# Patient Record
Sex: FEMALE | Race: WHITE | NOT HISPANIC OR LATINO | Employment: OTHER | URBAN - METROPOLITAN AREA
[De-identification: names, ages, dates, MRNs, and addresses within clinical notes are randomized per-mention and may not be internally consistent; named-entity substitution may affect disease eponyms.]

---

## 2017-03-17 ENCOUNTER — LAB (OUTPATIENT)
Dept: LAB | Facility: HOSPITAL | Age: 57
End: 2017-03-17

## 2017-03-17 ENCOUNTER — OFFICE VISIT (OUTPATIENT)
Dept: BARIATRICS/WEIGHT MGMT | Facility: CLINIC | Age: 57
End: 2017-03-17

## 2017-03-17 VITALS
DIASTOLIC BLOOD PRESSURE: 45 MMHG | TEMPERATURE: 97.8 F | HEART RATE: 70 BPM | SYSTOLIC BLOOD PRESSURE: 100 MMHG | RESPIRATION RATE: 16 BRPM | WEIGHT: 208 LBS | HEIGHT: 68 IN | BODY MASS INDEX: 31.52 KG/M2

## 2017-03-17 DIAGNOSIS — K21.9 GASTROESOPHAGEAL REFLUX DISEASE WITHOUT ESOPHAGITIS: ICD-10-CM

## 2017-03-17 DIAGNOSIS — M25.50 MULTIPLE JOINT PAIN: ICD-10-CM

## 2017-03-17 DIAGNOSIS — K59.00 CONSTIPATION, UNSPECIFIED CONSTIPATION TYPE: ICD-10-CM

## 2017-03-17 DIAGNOSIS — E66.9 OBESITY (BMI 30-39.9): ICD-10-CM

## 2017-03-17 DIAGNOSIS — E78.5 BORDERLINE HYPERLIPIDEMIA: ICD-10-CM

## 2017-03-17 DIAGNOSIS — E03.9 HYPOTHYROIDISM, UNSPECIFIED TYPE: ICD-10-CM

## 2017-03-17 DIAGNOSIS — Z71.3 DIETARY COUNSELING: ICD-10-CM

## 2017-03-17 DIAGNOSIS — I10 ESSENTIAL HYPERTENSION: ICD-10-CM

## 2017-03-17 DIAGNOSIS — L65.9 ALOPECIA: ICD-10-CM

## 2017-03-17 DIAGNOSIS — R42 DIZZINESS: ICD-10-CM

## 2017-03-17 DIAGNOSIS — E66.9 OBESITY (BMI 30-39.9): Primary | ICD-10-CM

## 2017-03-17 LAB
ALBUMIN SERPL-MCNC: 4.4 G/DL (ref 3.5–5.2)
ALBUMIN/GLOB SERPL: 1.8 G/DL
ALP SERPL-CCNC: 104 U/L (ref 39–117)
ALT SERPL W P-5'-P-CCNC: 27 U/L (ref 1–33)
ANION GAP SERPL CALCULATED.3IONS-SCNC: 15 MMOL/L
AST SERPL-CCNC: 20 U/L (ref 1–32)
BASOPHILS # BLD AUTO: 0.05 10*3/MM3 (ref 0–0.2)
BASOPHILS NFR BLD AUTO: 0.5 % (ref 0–1.5)
BILIRUB SERPL-MCNC: 0.6 MG/DL (ref 0.1–1.2)
BUN BLD-MCNC: 23 MG/DL (ref 6–20)
BUN/CREAT SERPL: 27.1 (ref 7–25)
CALCIUM SPEC-SCNC: 10.1 MG/DL (ref 8.6–10.5)
CHLORIDE SERPL-SCNC: 99 MMOL/L (ref 98–107)
CHOLEST SERPL-MCNC: 238 MG/DL (ref 0–200)
CO2 SERPL-SCNC: 27 MMOL/L (ref 22–29)
CREAT BLD-MCNC: 0.85 MG/DL (ref 0.57–1)
DEPRECATED RDW RBC AUTO: 55 FL (ref 37–54)
EOSINOPHIL # BLD AUTO: 0.21 10*3/MM3 (ref 0–0.7)
EOSINOPHIL NFR BLD AUTO: 2.2 % (ref 0.3–6.2)
ERYTHROCYTE [DISTWIDTH] IN BLOOD BY AUTOMATED COUNT: 17.3 % (ref 11.7–13)
FERRITIN SERPL-MCNC: 19.29 NG/ML (ref 13–150)
FOLATE SERPL-MCNC: >20 NG/ML (ref 4.78–24.2)
GFR SERPL CREATININE-BSD FRML MDRD: 69 ML/MIN/1.73
GLOBULIN UR ELPH-MCNC: 2.4 GM/DL
GLUCOSE BLD-MCNC: 95 MG/DL (ref 65–99)
HCT VFR BLD AUTO: 41.6 % (ref 35.6–45.5)
HDLC SERPL-MCNC: 59 MG/DL (ref 40–60)
HGB BLD-MCNC: 13.1 G/DL (ref 11.9–15.5)
IMM GRANULOCYTES # BLD: 0.02 10*3/MM3 (ref 0–0.03)
IMM GRANULOCYTES NFR BLD: 0.2 % (ref 0–0.5)
IRON 24H UR-MRATE: 93 MCG/DL (ref 37–145)
LDLC SERPL CALC-MCNC: 143 MG/DL (ref 0–100)
LDLC/HDLC SERPL: 2.42 {RATIO}
LYMPHOCYTES # BLD AUTO: 2.74 10*3/MM3 (ref 0.9–4.8)
LYMPHOCYTES NFR BLD AUTO: 28.4 % (ref 19.6–45.3)
MCH RBC QN AUTO: 27.5 PG (ref 26.9–32)
MCHC RBC AUTO-ENTMCNC: 31.5 G/DL (ref 32.4–36.3)
MCV RBC AUTO: 87.2 FL (ref 80.5–98.2)
MONOCYTES # BLD AUTO: 0.53 10*3/MM3 (ref 0.2–1.2)
MONOCYTES NFR BLD AUTO: 5.5 % (ref 5–12)
NEUTROPHILS # BLD AUTO: 6.1 10*3/MM3 (ref 1.9–8.1)
NEUTROPHILS NFR BLD AUTO: 63.2 % (ref 42.7–76)
PLATELET # BLD AUTO: 326 10*3/MM3 (ref 140–500)
PMV BLD AUTO: 11.1 FL (ref 6–12)
POTASSIUM BLD-SCNC: 3.5 MMOL/L (ref 3.5–5.2)
PROT SERPL-MCNC: 6.8 G/DL (ref 6–8.5)
RBC # BLD AUTO: 4.77 10*6/MM3 (ref 3.9–5.2)
SODIUM BLD-SCNC: 141 MMOL/L (ref 136–145)
TRIGL SERPL-MCNC: 182 MG/DL (ref 0–150)
VLDLC SERPL-MCNC: 36.4 MG/DL (ref 5–40)
WBC NRBC COR # BLD: 9.65 10*3/MM3 (ref 4.5–10.7)

## 2017-03-17 PROCEDURE — 82746 ASSAY OF FOLIC ACID SERUM: CPT

## 2017-03-17 PROCEDURE — 82728 ASSAY OF FERRITIN: CPT

## 2017-03-17 PROCEDURE — 99213 OFFICE O/P EST LOW 20 MIN: CPT | Performed by: NURSE PRACTITIONER

## 2017-03-17 PROCEDURE — 36415 COLL VENOUS BLD VENIPUNCTURE: CPT

## 2017-03-17 PROCEDURE — 84425 ASSAY OF VITAMIN B-1: CPT

## 2017-03-17 PROCEDURE — 80061 LIPID PANEL: CPT

## 2017-03-17 PROCEDURE — 83921 ORGANIC ACID SINGLE QUANT: CPT

## 2017-03-17 PROCEDURE — 83540 ASSAY OF IRON: CPT

## 2017-03-17 PROCEDURE — 80053 COMPREHEN METABOLIC PANEL: CPT

## 2017-03-17 PROCEDURE — 85025 COMPLETE CBC W/AUTO DIFF WBC: CPT

## 2017-03-17 NOTE — PROGRESS NOTES
[facility] BARIATRIC SURGERY  [address]  [facility] [address]  [address]  [facility] [address]  Dept: [phone]  3/17/2017      Peggy Winter.  [id number]  [id number]  1960  female      Chief Complaint   Patient presents with   • Follow-up     s/p gastric sleeve c/o constipation and alopecia       BH Post-Op Bariatric Surgery:   Peggy Winter is status post laparopscopic Sleeve procedure, performed on 09/26/16    HPI:   Today's weight is 208 lb (94.3 kg) pounds, today's BMI is Body mass index is 31.63 kg/(m^2)., she has a  loss of 21 pounds since the last visit and her weight loss since surgery is 68 pounds. The patient reports a decreased portion size and loss of appetite.      Peggy Winter denies nausea, vomiting, dysphagia, abdominal pain or heartburn. C/O constipation that is improved since starting probiotic and alopecia.      Diet and Exercise: Diet history reviewed and discussed with the patient. Weight loss/gains to date discussed with the patient. The patient states they are eating 70+ grams of protein per day. She reports eating 3 meals per day, a typical portion size of 3/4 cup, eating 1 snacks per day, drinking 5 or more 8-oz. glasses of water per day, no carbonated beverage consumption and exercising regularly- water exercise and strength training.     Supplements: Bariatric Advantage MVI with iron.     Review of Systems   Constitutional:        Alopecia   Gastrointestinal: Positive for constipation.   All other systems reviewed and are negative.      Patient Active Problem List   Diagnosis   • GERD (gastroesophageal reflux disease)   • Essential hypertension   • Chronic fatigue   • Edema   • Multiple joint pain   • Dupuytren's disease   • Depression with anxiety   • Hypothyroidism   • Borderline hyperlipidemia   • Multiple gastric polyps   • Dietary counseling   • Constipation   • Dizziness   • Obesity (BMI  30-39.9)       The following portions of the patient's history were reviewed and updated as appropriate: allergies, current medications, past family history, past medical history, past social history, past surgical history and problem list.    Vitals:    03/17/17 1035   BP: 100/45   Pulse: 70   Resp: 16   Temp: 97.8 °F (36.6 °C)       Physical Exam   Constitutional: She is oriented to person, place, and time. She appears well-developed and well-nourished.   HENT:   Head: Normocephalic and atraumatic.   Eyes: EOM are normal.   Cardiovascular: Normal rate, regular rhythm and normal heart sounds.    Pulmonary/Chest: Effort normal and breath sounds normal.   Abdominal: Soft. Bowel sounds are normal. She exhibits no distension. There is no tenderness.   Musculoskeletal: Normal range of motion.   Neurological: She is alert and oriented to person, place, and time.   Skin: Skin is warm and dry.   Psychiatric: She has a normal mood and affect. Her behavior is normal. Judgment and thought content normal.   Vitals reviewed.        Assessment:   Post-op, the patient is doing well.     Plan:     Encouraged patient to be sure to get plenty of lean protein per day through small frequent meals all with a protein source. Be sure to continue with plenty of water and fiber/probiotic to help with constipation. Continue with biotin for alopecia. Check in with PCP to reduce blood pressure medication to help with light headedness at times with orthostatic/positioning. Discussed weaning off PPI.   Activity restrictions: none.   Recommended patient be sure to get at least 70 grams of protein per day by eating small, frequent meals all with high lean protein choices. Be sure to limit/cut back on daily carbohydrate intake. Discussed with the patient the recommended amount of water per day to intake- half of body weight in ounces. Reviewed vitamin requirements. Be sure to do routine exercise, 150 minutes per week minimum, including both cardio  and strength training.     Instructions / Recommendations: dietary counseling recommended, recommended a daily protein intake of  grams, vitamin supplement(s) recommended, recommended exercising at least 150 minutes per week, behavior modifications recommended and instructed to call the office for concerns, questions, or problems.     The patient was instructed to follow up in 3 months.     The patient was counseled regarding. Total time spent face to face was 15 minutes and more than half the time was spent counseling.

## 2017-03-21 LAB
METHYLMALONATE SERPL-SCNC: 152 NMOL/L (ref 0–378)
VIT B1 BLD-SCNC: 326.2 NMOL/L (ref 66.5–200)

## 2017-05-12 ENCOUNTER — TELEPHONE (OUTPATIENT)
Dept: BARIATRICS/WEIGHT MGMT | Facility: CLINIC | Age: 57
End: 2017-05-12

## 2017-05-17 ENCOUNTER — OFFICE VISIT (OUTPATIENT)
Dept: BARIATRICS/WEIGHT MGMT | Facility: CLINIC | Age: 57
End: 2017-05-17

## 2017-05-17 ENCOUNTER — LAB (OUTPATIENT)
Dept: LAB | Facility: HOSPITAL | Age: 57
End: 2017-05-17

## 2017-05-17 VITALS
HEART RATE: 53 BPM | DIASTOLIC BLOOD PRESSURE: 52 MMHG | TEMPERATURE: 98.6 F | WEIGHT: 196 LBS | BODY MASS INDEX: 29.7 KG/M2 | SYSTOLIC BLOOD PRESSURE: 94 MMHG | RESPIRATION RATE: 16 BRPM | HEIGHT: 68 IN

## 2017-05-17 DIAGNOSIS — K21.9 GASTROESOPHAGEAL REFLUX DISEASE WITHOUT ESOPHAGITIS: ICD-10-CM

## 2017-05-17 DIAGNOSIS — R19.7 DIARRHEA, UNSPECIFIED TYPE: ICD-10-CM

## 2017-05-17 DIAGNOSIS — R53.82 CHRONIC FATIGUE: ICD-10-CM

## 2017-05-17 DIAGNOSIS — I10 ESSENTIAL HYPERTENSION: ICD-10-CM

## 2017-05-17 DIAGNOSIS — L65.9 ALOPECIA: ICD-10-CM

## 2017-05-17 DIAGNOSIS — R60.9 EDEMA, UNSPECIFIED TYPE: ICD-10-CM

## 2017-05-17 DIAGNOSIS — E78.5 BORDERLINE HYPERLIPIDEMIA: ICD-10-CM

## 2017-05-17 DIAGNOSIS — Z71.3 DIETARY COUNSELING: ICD-10-CM

## 2017-05-17 DIAGNOSIS — E03.9 HYPOTHYROIDISM, UNSPECIFIED TYPE: ICD-10-CM

## 2017-05-17 LAB
ALBUMIN SERPL-MCNC: 4.4 G/DL (ref 3.5–5.2)
ALBUMIN/GLOB SERPL: 1.7 G/DL
ALP SERPL-CCNC: 87 U/L (ref 39–117)
ALT SERPL W P-5'-P-CCNC: 31 U/L (ref 1–33)
ANION GAP SERPL CALCULATED.3IONS-SCNC: 13.9 MMOL/L
AST SERPL-CCNC: 34 U/L (ref 1–32)
BASOPHILS # BLD AUTO: 0.05 10*3/MM3 (ref 0–0.2)
BASOPHILS NFR BLD AUTO: 0.5 % (ref 0–1.5)
BILIRUB SERPL-MCNC: 0.8 MG/DL (ref 0.1–1.2)
BUN BLD-MCNC: 23 MG/DL (ref 6–20)
BUN/CREAT SERPL: 25.3 (ref 7–25)
CALCIUM SPEC-SCNC: 9.2 MG/DL (ref 8.6–10.5)
CHLORIDE SERPL-SCNC: 98 MMOL/L (ref 98–107)
CO2 SERPL-SCNC: 27.1 MMOL/L (ref 22–29)
CREAT BLD-MCNC: 0.91 MG/DL (ref 0.57–1)
DEPRECATED RDW RBC AUTO: 49.9 FL (ref 37–54)
EOSINOPHIL # BLD AUTO: 0.11 10*3/MM3 (ref 0–0.7)
EOSINOPHIL NFR BLD AUTO: 1.2 % (ref 0.3–6.2)
ERYTHROCYTE [DISTWIDTH] IN BLOOD BY AUTOMATED COUNT: 15.5 % (ref 11.7–13)
FERRITIN SERPL-MCNC: 21.32 NG/ML (ref 13–150)
GFR SERPL CREATININE-BSD FRML MDRD: 64 ML/MIN/1.73
GLOBULIN UR ELPH-MCNC: 2.6 GM/DL
GLUCOSE BLD-MCNC: 85 MG/DL (ref 65–99)
HCT VFR BLD AUTO: 39.2 % (ref 35.6–45.5)
HGB BLD-MCNC: 12.7 G/DL (ref 11.9–15.5)
IMM GRANULOCYTES # BLD: 0.02 10*3/MM3 (ref 0–0.03)
IMM GRANULOCYTES NFR BLD: 0.2 % (ref 0–0.5)
LYMPHOCYTES # BLD AUTO: 2.13 10*3/MM3 (ref 0.9–4.8)
LYMPHOCYTES NFR BLD AUTO: 22.5 % (ref 19.6–45.3)
MCH RBC QN AUTO: 28.3 PG (ref 26.9–32)
MCHC RBC AUTO-ENTMCNC: 32.4 G/DL (ref 32.4–36.3)
MCV RBC AUTO: 87.3 FL (ref 80.5–98.2)
MONOCYTES # BLD AUTO: 0.55 10*3/MM3 (ref 0.2–1.2)
MONOCYTES NFR BLD AUTO: 5.8 % (ref 5–12)
NEUTROPHILS # BLD AUTO: 6.62 10*3/MM3 (ref 1.9–8.1)
NEUTROPHILS NFR BLD AUTO: 69.8 % (ref 42.7–76)
NRBC BLD MANUAL-RTO: 0 /100 WBC (ref 0–0)
PLATELET # BLD AUTO: 356 10*3/MM3 (ref 140–500)
PMV BLD AUTO: 11.2 FL (ref 6–12)
POTASSIUM BLD-SCNC: 3.1 MMOL/L (ref 3.5–5.2)
PROT SERPL-MCNC: 7 G/DL (ref 6–8.5)
RBC # BLD AUTO: 4.49 10*6/MM3 (ref 3.9–5.2)
SODIUM BLD-SCNC: 139 MMOL/L (ref 136–145)
WBC NRBC COR # BLD: 9.48 10*3/MM3 (ref 4.5–10.7)

## 2017-05-17 PROCEDURE — 85025 COMPLETE CBC W/AUTO DIFF WBC: CPT

## 2017-05-17 PROCEDURE — 83921 ORGANIC ACID SINGLE QUANT: CPT

## 2017-05-17 PROCEDURE — 84425 ASSAY OF VITAMIN B-1: CPT

## 2017-05-17 PROCEDURE — 80053 COMPREHEN METABOLIC PANEL: CPT

## 2017-05-17 PROCEDURE — 36415 COLL VENOUS BLD VENIPUNCTURE: CPT

## 2017-05-17 PROCEDURE — 99214 OFFICE O/P EST MOD 30 MIN: CPT | Performed by: NURSE PRACTITIONER

## 2017-05-17 PROCEDURE — 82728 ASSAY OF FERRITIN: CPT

## 2017-05-19 LAB — VIT B1 BLD-SCNC: 232.5 NMOL/L (ref 66.5–200)

## 2017-05-20 LAB — METHYLMALONATE SERPL-SCNC: 134 NMOL/L (ref 0–378)

## 2017-10-09 ENCOUNTER — OFFICE VISIT (OUTPATIENT)
Dept: OBSTETRICS AND GYNECOLOGY | Age: 57
End: 2017-10-09

## 2017-10-09 ENCOUNTER — PROCEDURE VISIT (OUTPATIENT)
Dept: OBSTETRICS AND GYNECOLOGY | Age: 57
End: 2017-10-09

## 2017-10-09 DIAGNOSIS — R10.2 PELVIC PAIN: ICD-10-CM

## 2017-10-09 DIAGNOSIS — Z11.51 SPECIAL SCREENING EXAMINATION FOR HUMAN PAPILLOMAVIRUS (HPV): ICD-10-CM

## 2017-10-09 DIAGNOSIS — Z00.00 ANNUAL PHYSICAL EXAM: Primary | ICD-10-CM

## 2017-10-09 DIAGNOSIS — Z12.4 ROUTINE CERVICAL SMEAR: ICD-10-CM

## 2017-10-09 DIAGNOSIS — Z78.0 POST-MENOPAUSAL: Primary | ICD-10-CM

## 2017-10-09 PROCEDURE — 99386 PREV VISIT NEW AGE 40-64: CPT | Performed by: OBSTETRICS & GYNECOLOGY

## 2017-10-09 PROCEDURE — 77080 DXA BONE DENSITY AXIAL: CPT | Performed by: OBSTETRICS & GYNECOLOGY

## 2017-10-09 RX ORDER — PANTOPRAZOLE SODIUM 20 MG/1
20 TABLET, DELAYED RELEASE ORAL DAILY
COMMUNITY
End: 2021-06-03

## 2017-10-09 RX ORDER — TRIAMTERENE AND HYDROCHLOROTHIAZIDE 37.5; 25 MG/1; MG/1
1 CAPSULE ORAL EVERY MORNING
COMMUNITY
End: 2021-07-21

## 2017-10-09 RX ORDER — LEVOTHYROXINE SODIUM 0.1 MG/1
100 TABLET ORAL DAILY
COMMUNITY
End: 2021-07-21

## 2017-10-09 NOTE — PROGRESS NOTES
Subjective   Peggy Winter is a 57 y.o. female is being seen today for No chief complaint on file.  .    History of Present Illness  Patient is here for an annual check and a problem check.  I have not seen her in 15 years but I did perform hysterectomy on her 16 years ago for bleeding with fibroids.  She is back for an annual again for the first time in 15 years since she has a problem and the fact that for 6 months she's had a type of pelvic pain.  This is on the outside of vulva on the left side comes up into the inguinal region.  It comes and goes it does not wake her up is not affected by activity or GI or  systems.  He can be sharp stabbing pain and throbbing pain.  Virus her past history and updated her medicines she is a  2 boys no grandchildren yet.  Her medical problems include knee problems shoulder problems in disc problems.  She is hypothyroid has reflux hypertension.  Her surgeries include gastric sleeve she is lost 20 pounds, tonsils, call bladder, hysterectomy, and foot surgery ×2.    I then went over the family history and is a lot of cancer mother breast cancer at 40, father prostate, cousin breast another cousin with ovarian.  I discussed BRCA testing she would like to have that done so we will do that today.  No other complaints  The following portions of the patient's history were reviewed and updated as appropriate: allergies, current medications, past family history, past medical history, past social history, past surgical history and problem list.    There were no vitals filed for this visit.    PAST MEDICAL HISTORY  Past Medical History:   Diagnosis Date   • Anxiety    • Cataract    • Degenerative disc disease, lumbar    • Depression    • Edema     HANDS AND LEGS   • Fatigue    • Fibromyalgia    • Heartburn    • Hemorrhoids    • HTN (hypertension)    • Hypothyroid    • Joint pain    • Osteoarthritis    • PONV (postoperative nausea and vomiting)    • Restless leg syndrome    • Urge  and stress incontinence      OB History     No data available        Past Surgical History:   Procedure Laterality Date   • COLONOSCOPY     • DILATATION AND CURETTAGE     • FOOT SURGERY Left     X 2   • GASTRIC SLEEVE LAPAROSCOPIC N/A 9/26/2016    Procedure: GASTRIC SLEEVE LAPAROSCOPIC;  Surgeon: Yuniel Orantes Jr., MD;  [facility]  Service:    • HYSTERECTOMY     • LAPAROSCOPIC CHOLECYSTECTOMY     • NERVE ROOT EXPLORATION BACK     • TONSILLECTOMY     • TUBAL ABDOMINAL LIGATION       Family History   Problem Relation Age of Onset   • Sleep apnea Mother    • Cancer Mother    • Sleep apnea Father    • Cancer Father    • Diabetes Brother    • Heart attack Brother    • Hypertension Brother    • Obesity Maternal Grandmother    • Diabetes Maternal Grandmother    • Hypertension Maternal Grandmother    • Heart attack Maternal Grandmother    • Heart attack Maternal Grandfather      History   Smoking Status   • Never Smoker   Smokeless Tobacco   • Not on file       Current Outpatient Prescriptions:   •  HYDROcodone-acetaminophen (VICODIN) 5-500 MG per tablet, Take 1 tablet by mouth Every 6 (Six) Hours As Needed., Disp: , Rfl:   •  levothyroxine (SYNTHROID, LEVOTHROID) 100 MCG tablet, Take 100 mcg by mouth Daily., Disp: , Rfl:   •  pantoprazole (PROTONIX) 20 MG EC tablet, Take 20 mg by mouth Daily., Disp: , Rfl:   •  triamterene-hydrochlorothiazide (DYAZIDE) 37.5-25 MG per capsule, Take 1 capsule by mouth Every Morning., Disp: , Rfl:   •  Calcium-Phosphorus-Vitamin D (CALCIUM GUMMIES) 250-100-500 MG-MG-UNIT chewable tablet, Chew 1 tablet daily., Disp: , Rfl:   •  clonazePAM (KlonoPIN) 2 MG tablet, Take 2 mg by mouth 2 (two) times a day., Disp: , Rfl:   •  cyanocobalamin 100 MCG tablet, Take 1 tablet by mouth daily., Disp: , Rfl:   •  cyclobenzaprine (FLEXERIL) 10 MG tablet, Take 10 mg by mouth as needed., Disp: , Rfl:   •  FLUoxetine (PROzac) 20 MG capsule, Take 60 mg by mouth., Disp: , Rfl:   •  gabapentin  (NEURONTIN) 300 MG capsule, TAKE ONE CAPSULE BY MOUTH THREE TIMES DAILY, Disp: , Rfl:   •  POTASSIUM PO, Take 1 tablet by mouth daily., Disp: , Rfl:     There is no immunization history on file for this patient.    Review of Systems   Constitutional: Negative for chills, fatigue, fever and unexpected weight change.   Respiratory: Negative for shortness of breath and wheezing.    Cardiovascular: Positive for leg swelling. Negative for chest pain.   Gastrointestinal: Negative for abdominal distention, abdominal pain, blood in stool, constipation, diarrhea and nausea.   Genitourinary: Negative for difficulty urinating, dyspareunia, dysuria, frequency, hematuria, menstrual problem, pelvic pain, urgency and vaginal discharge.   Musculoskeletal: Positive for arthralgias and back pain.   Skin: Negative for rash.       Objective   Physical Exam   Constitutional: She is oriented to person, place, and time. Vital signs are normal. She appears well-developed and well-nourished.   Neck: No thyromegaly present.   Cardiovascular: Normal rate, regular rhythm and normal heart sounds.    Pulmonary/Chest: Effort normal. Right breast exhibits no inverted nipple, no mass, no nipple discharge, no skin change and no tenderness. Left breast exhibits no inverted nipple, no mass, no nipple discharge, no skin change and no tenderness. Breasts are symmetrical. There is no breast swelling.   Abdominal: Soft.   Genitourinary: Vagina normal. No breast tenderness, discharge or bleeding. Pelvic exam was performed with patient supine. No labial fusion. There is no rash, tenderness, lesion or injury on the right labia. There is no rash, tenderness, lesion or injury on the left labia. Cervix exhibits no motion tenderness, no discharge and no friability. Right adnexum displays no mass, no tenderness and no fullness. Left adnexum displays no mass, no tenderness and no fullness.   Neurological: She is alert and oriented to person, place, and time.    Psychiatric: She has a normal mood and affect.   Vitals reviewed.        Assessment/Plan   Diagnoses and all orders for this visit:    Annual physical exam    Pelvic pain      The exam was overall normal.  I did not palpate any abnormality or any discomfort on the vulva or internally with a bimanual.  There was some stool present the left lower quadrant that no other masses.  I discussed this with the patient that I do not have a diagnosis for her pain at this time.  Offered her further workup and she would like to proceed so we'll order a CAT scan.  I may have to refer her to surgeon to check for potential hernia.  Pap smear was done today.  She'll be due for mammogram very soon she does keep up on that.  Last colonoscopy was 3 years ago last EGD was 1 year ago.  Also performed a bone density today is very normal range.  Her mother does have osteoporosis.  We talked about diet and excise.  Come back in year

## 2017-10-11 LAB
CYTOLOGIST CVX/VAG CYTO: NORMAL
CYTOLOGY CVX/VAG DOC THIN PREP: NORMAL
DX ICD CODE: NORMAL
HIV 1 & 2 AB SER-IMP: NORMAL
HPV I/H RISK 4 DNA CVX QL PROBE+SIG AMP: NEGATIVE
OTHER STN SPEC: NORMAL
PATH REPORT.FINAL DX SPEC: NORMAL
STAT OF ADQ CVX/VAG CYTO-IMP: NORMAL

## 2017-10-12 ENCOUNTER — TELEPHONE (OUTPATIENT)
Dept: OBSTETRICS AND GYNECOLOGY | Age: 57
End: 2017-10-12

## 2017-10-12 NOTE — TELEPHONE ENCOUNTER
----- Message from Higinio Araiza MD sent at 10/11/2017  1:15 PM EDT -----  Tell patient negative Pap

## 2017-10-13 ENCOUNTER — OFFICE VISIT (OUTPATIENT)
Dept: BARIATRICS/WEIGHT MGMT | Facility: CLINIC | Age: 57
End: 2017-10-13

## 2017-10-13 ENCOUNTER — TELEPHONE (OUTPATIENT)
Dept: BARIATRICS/WEIGHT MGMT | Facility: CLINIC | Age: 57
End: 2017-10-13

## 2017-10-13 ENCOUNTER — LAB (OUTPATIENT)
Dept: LAB | Facility: HOSPITAL | Age: 57
End: 2017-10-13

## 2017-10-13 VITALS
BODY MASS INDEX: 26.75 KG/M2 | TEMPERATURE: 98.4 F | HEART RATE: 55 BPM | HEIGHT: 68 IN | DIASTOLIC BLOOD PRESSURE: 61 MMHG | RESPIRATION RATE: 16 BRPM | WEIGHT: 176.5 LBS | SYSTOLIC BLOOD PRESSURE: 102 MMHG

## 2017-10-13 DIAGNOSIS — E78.5 BORDERLINE HYPERLIPIDEMIA: ICD-10-CM

## 2017-10-13 DIAGNOSIS — R53.82 CHRONIC FATIGUE: ICD-10-CM

## 2017-10-13 DIAGNOSIS — E66.3 OVERWEIGHT (BMI 25.0-29.9): Primary | ICD-10-CM

## 2017-10-13 DIAGNOSIS — Z71.3 DIETARY COUNSELING: ICD-10-CM

## 2017-10-13 DIAGNOSIS — E03.8 OTHER SPECIFIED HYPOTHYROIDISM: ICD-10-CM

## 2017-10-13 DIAGNOSIS — I10 ESSENTIAL HYPERTENSION: ICD-10-CM

## 2017-10-13 DIAGNOSIS — M25.50 MULTIPLE JOINT PAIN: ICD-10-CM

## 2017-10-13 DIAGNOSIS — K21.9 GASTROESOPHAGEAL REFLUX DISEASE WITHOUT ESOPHAGITIS: ICD-10-CM

## 2017-10-13 PROBLEM — R19.7 DIARRHEA: Status: RESOLVED | Noted: 2017-05-17 | Resolved: 2017-10-13

## 2017-10-13 LAB
25(OH)D3 SERPL-MCNC: 104.8 NG/ML (ref 30–100)
ALBUMIN SERPL-MCNC: 4.3 G/DL (ref 3.5–5.2)
ALBUMIN/GLOB SERPL: 1.7 G/DL
ALP SERPL-CCNC: 59 U/L (ref 39–117)
ALT SERPL W P-5'-P-CCNC: 8 U/L (ref 1–33)
ANION GAP SERPL CALCULATED.3IONS-SCNC: 12 MMOL/L
AST SERPL-CCNC: 16 U/L (ref 1–32)
BASOPHILS # BLD AUTO: 0.05 10*3/MM3 (ref 0–0.2)
BASOPHILS NFR BLD AUTO: 0.6 % (ref 0–1.5)
BILIRUB SERPL-MCNC: 0.7 MG/DL (ref 0.1–1.2)
BUN BLD-MCNC: 20 MG/DL (ref 6–20)
BUN/CREAT SERPL: 29 (ref 7–25)
CALCIUM SPEC-SCNC: 9.6 MG/DL (ref 8.6–10.5)
CHLORIDE SERPL-SCNC: 101 MMOL/L (ref 98–107)
CO2 SERPL-SCNC: 29 MMOL/L (ref 22–29)
CREAT BLD-MCNC: 0.69 MG/DL (ref 0.57–1)
DEPRECATED RDW RBC AUTO: 47.9 FL (ref 37–54)
EOSINOPHIL # BLD AUTO: 0.21 10*3/MM3 (ref 0–0.7)
EOSINOPHIL NFR BLD AUTO: 2.7 % (ref 0.3–6.2)
ERYTHROCYTE [DISTWIDTH] IN BLOOD BY AUTOMATED COUNT: 13.9 % (ref 11.7–13)
FERRITIN SERPL-MCNC: 30.03 NG/ML (ref 13–150)
FOLATE SERPL-MCNC: >20 NG/ML (ref 4.78–24.2)
GFR SERPL CREATININE-BSD FRML MDRD: 88 ML/MIN/1.73
GLOBULIN UR ELPH-MCNC: 2.5 GM/DL
GLUCOSE BLD-MCNC: 86 MG/DL (ref 65–99)
HBA1C MFR BLD: 5.5 % (ref 4.8–5.6)
HCT VFR BLD AUTO: 41.5 % (ref 35.6–45.5)
HGB BLD-MCNC: 13.3 G/DL (ref 11.9–15.5)
IMM GRANULOCYTES # BLD: 0 10*3/MM3 (ref 0–0.03)
IMM GRANULOCYTES NFR BLD: 0 % (ref 0–0.5)
IRON 24H UR-MRATE: 67 MCG/DL (ref 37–145)
LYMPHOCYTES # BLD AUTO: 2.55 10*3/MM3 (ref 0.9–4.8)
LYMPHOCYTES NFR BLD AUTO: 32.2 % (ref 19.6–45.3)
MAGNESIUM SERPL-MCNC: 1.8 MG/DL (ref 1.6–2.6)
MCH RBC QN AUTO: 30.6 PG (ref 26.9–32)
MCHC RBC AUTO-ENTMCNC: 32 G/DL (ref 32.4–36.3)
MCV RBC AUTO: 95.4 FL (ref 80.5–98.2)
MONOCYTES # BLD AUTO: 0.54 10*3/MM3 (ref 0.2–1.2)
MONOCYTES NFR BLD AUTO: 6.8 % (ref 5–12)
NEUTROPHILS # BLD AUTO: 4.57 10*3/MM3 (ref 1.9–8.1)
NEUTROPHILS NFR BLD AUTO: 57.7 % (ref 42.7–76)
PHOSPHATE SERPL-MCNC: 4.3 MG/DL (ref 2.5–4.5)
PLATELET # BLD AUTO: 316 10*3/MM3 (ref 140–500)
PMV BLD AUTO: 10.3 FL (ref 6–12)
POTASSIUM BLD-SCNC: 3.5 MMOL/L (ref 3.5–5.2)
PREALB SERPL-MCNC: 27.5 MG/DL (ref 20–40)
PROT SERPL-MCNC: 6.8 G/DL (ref 6–8.5)
PTH-INTACT SERPL-MCNC: 31.3 PG/ML (ref 15–65)
RBC # BLD AUTO: 4.35 10*6/MM3 (ref 3.9–5.2)
SODIUM BLD-SCNC: 142 MMOL/L (ref 136–145)
TSH SERPL DL<=0.05 MIU/L-ACNC: 2.75 MIU/ML (ref 0.27–4.2)
WBC NRBC COR # BLD: 7.92 10*3/MM3 (ref 4.5–10.7)

## 2017-10-13 PROCEDURE — 83970 ASSAY OF PARATHORMONE: CPT

## 2017-10-13 PROCEDURE — 83735 ASSAY OF MAGNESIUM: CPT

## 2017-10-13 PROCEDURE — 80053 COMPREHEN METABOLIC PANEL: CPT

## 2017-10-13 PROCEDURE — 84134 ASSAY OF PREALBUMIN: CPT

## 2017-10-13 PROCEDURE — 83540 ASSAY OF IRON: CPT

## 2017-10-13 PROCEDURE — 83036 HEMOGLOBIN GLYCOSYLATED A1C: CPT

## 2017-10-13 PROCEDURE — 84425 ASSAY OF VITAMIN B-1: CPT

## 2017-10-13 PROCEDURE — 84630 ASSAY OF ZINC: CPT

## 2017-10-13 PROCEDURE — 84100 ASSAY OF PHOSPHORUS: CPT

## 2017-10-13 PROCEDURE — 84590 ASSAY OF VITAMIN A: CPT

## 2017-10-13 PROCEDURE — 84446 ASSAY OF VITAMIN E: CPT

## 2017-10-13 PROCEDURE — 82306 VITAMIN D 25 HYDROXY: CPT

## 2017-10-13 PROCEDURE — 82746 ASSAY OF FOLIC ACID SERUM: CPT

## 2017-10-13 PROCEDURE — 36415 COLL VENOUS BLD VENIPUNCTURE: CPT

## 2017-10-13 PROCEDURE — 84443 ASSAY THYROID STIM HORMONE: CPT

## 2017-10-13 PROCEDURE — 99213 OFFICE O/P EST LOW 20 MIN: CPT | Performed by: SURGERY

## 2017-10-13 PROCEDURE — 84597 ASSAY OF VITAMIN K: CPT

## 2017-10-13 PROCEDURE — 85025 COMPLETE CBC W/AUTO DIFF WBC: CPT

## 2017-10-13 PROCEDURE — 82728 ASSAY OF FERRITIN: CPT

## 2017-10-13 PROCEDURE — 83921 ORGANIC ACID SINGLE QUANT: CPT

## 2017-10-13 NOTE — TELEPHONE ENCOUNTER
The patient's vitamin D level is 104.8, which is too high.  She needs to come down off of her vitamin D supplement for 2 weeks and then followup with her PCP

## 2017-10-13 NOTE — PROGRESS NOTES
[facility]  [facility] BARIATRIC SURGERY  [address]  [facility] [id number] [phone]  [address]  [facility] [id number] [phone]  Dept: [phone]  10/13/2017      Peggy Winter.  [id number]  [id number]  1960  female      Chief Complaint   Patient presents with   • Follow-up     1 year followup gastric sleeve       BH Post-Op Bariatric Surgery:   Peggy Winter is status post Laparoscopic Sleeve procedure, performed on 9/26/16 at [facility]    HPI:   Today's weight is 176 lb 8 oz (80.1 kg) pounds, today's BMI is Body mass index is 26.84 kg/(m^2)., she has a  loss of 19 pounds since the last visit and her weight loss since surgery is 100 pounds. The patient reports a decreased portion size and loss of appetite.      Peggy Winter denies nausea, vomiting, dysphagia, abdominal pain or heartburn.     Diet and Exercise: Diet history reviewed and discussed with the patient. Weight loss/gains to date discussed with the patient. The patient states they are eating 80 grams of protein per day. She reports eating 3 meals per day, a typical portion size of 1 cup, eating 1 snacks per day, drinking 5 or more 8-oz. glasses of water per day, no carbonated beverage consumption and exercising regularly.     One year status post laparoscopic sleeve gastrectomy who is doing very well.  Only complaint is some hair loss which has decreased.  She is taking extra biotin and doing bariatric-type vitamins.    Supplements: Bariatric vitamins per gastric sleeve, vitamin B12.     Review of Systems   Constitutional: Positive for fatigue.   Musculoskeletal: Positive for arthralgias and back pain.   All other systems reviewed and are negative.      Patient Active Problem List   Diagnosis   • GERD (gastroesophageal reflux disease)   • Essential hypertension   • Chronic fatigue   • Edema   • Multiple joint pain   • Dupuytren's disease   • Depression with anxiety   • Other  specified hypothyroidism   • Borderline hyperlipidemia   • Multiple gastric polyps   • Dietary counseling   • Dizziness   • Overweight (BMI 25.0-29.9)   • Alopecia   • Pelvic pain       Past Medical History:   Diagnosis Date   • Anxiety    • Cataract    • Degenerative disc disease, lumbar    • Depression    • Edema     HANDS AND LEGS   • Fatigue    • Fibromyalgia    • Heartburn    • Hemorrhoids    • HTN (hypertension)    • Hypothyroid    • Joint pain    • Osteoarthritis    • PONV (postoperative nausea and vomiting)    • Restless leg syndrome    • Urge and stress incontinence        The following portions of the patient's history were reviewed and updated as appropriate: allergies, current medications, past family history, past medical history, past social history, past surgical history and problem list.    Vitals:    10/13/17 0835   BP: 102/61   Pulse: 55   Resp: 16   Temp: 98.4 °F (36.9 °C)       Physical Exam   Constitutional: She is oriented to person, place, and time. She appears well-nourished.   HENT:   Head: Normocephalic and atraumatic.   Mouth/Throat: Oropharynx is clear and moist.   Eyes: Conjunctivae and EOM are normal. Pupils are equal, round, and reactive to light. No scleral icterus.   Neck: Normal range of motion. Neck supple. No thyromegaly present.   Cardiovascular: Normal rate and regular rhythm.    Pulmonary/Chest: Effort normal and breath sounds normal.   Abdominal: Soft. Bowel sounds are normal. She exhibits no distension and no mass. There is no tenderness. There is no rebound and no guarding. No hernia.   Musculoskeletal: Normal range of motion.   Lymphadenopathy:     She has no cervical adenopathy.   Neurological: She is alert and oriented to person, place, and time. No cranial nerve deficit. Coordination normal.   Skin: Skin is warm and dry. No erythema.   Psychiatric: She has a normal mood and affect. Her behavior is normal.   Vitals reviewed.        Assessment:   Post-op, the patient One  year status post laparoscopic sleeve gastrectomy who is doing very well.  She has lost 100 pounds since her surgery and feels great.  She is exercising and eating high-protein vegetables fruit minimal simple carbs.  We'll check one-year labs and follow-up in 6 months.     Encounter Diagnoses   Name Primary?   • Overweight (BMI 25.0-29.9) Yes   • Dietary counseling    • Multiple joint pain    • Chronic fatigue    • Essential hypertension    • Gastroesophageal reflux disease without esophagitis    • Borderline hyperlipidemia    • Other specified hypothyroidism        Plan:     Encouraged patient to be sure to get plenty of lean protein per day through small frequent meals all with a protein source.   Activity restrictions: none.   Recommended patient be sure to get at least 70 grams of protein per day by eating small, frequent meals all with high lean protein choices. Be sure to limit/cut back on daily carbohydrate intake. Discussed with the patient the recommended amount of water per day to intake- half of body weight in ounces. Reviewed vitamin requirements. Be sure to do routine exercise, 150 minutes per week minimum, including both cardio and strength training.     Instructions / Recommendations: dietary counseling recommended, recommended a daily protein intake of  grams, vitamin supplement(s) recommended, recommended exercising at least 150 minutes per week, behavior modifications recommended and instructed to call the office for concerns, questions, or problems.     The patient was instructed to follow up in 6 months .     The patient was counseled regarding. Total time spent face to face was 25 minutes and 20 minutes was spent counseling.

## 2017-10-13 NOTE — TELEPHONE ENCOUNTER
Gave this information to the patient over the phone. She will discontinue her vitamin D supplementation and then f/u with her PCP

## 2017-10-15 LAB — ZINC SERPL-MCNC: 78 UG/DL (ref 56–134)

## 2017-10-16 LAB — VIT B1 BLD-SCNC: 188.4 NMOL/L (ref 66.5–200)

## 2017-10-19 ENCOUNTER — TELEPHONE (OUTPATIENT)
Dept: BARIATRICS/WEIGHT MGMT | Facility: CLINIC | Age: 57
End: 2017-10-19

## 2017-10-19 LAB
A-TOCOPHEROL VIT E SERPL-MCNC: 15.3 MG/L (ref 5.3–16.8)
PHYTONADIONE SERPL-MCNC: 0.3 NG/ML (ref 0.13–1.88)
VIT A SERPL-MCNC: 68 UG/DL (ref 20–65)

## 2017-10-19 NOTE — TELEPHONE ENCOUNTER
----- Message from Yuniel Orantes Jr., MD sent at 10/19/2017 12:33 PM EDT -----  Please call the patient regarding her abnormal result and if abnormal treat per protocol. Make sure she has follow up appointment.

## 2017-10-19 NOTE — TELEPHONE ENCOUNTER
Pt already aware of her vitamin D being too high and has decreased her supplementation.     Letter with the rest of her lab results mailed to her home address.

## 2017-10-23 LAB — METHYLMALONATE SERPL-SCNC: 172 NMOL/L (ref 0–378)

## 2017-10-24 ENCOUNTER — HOSPITAL ENCOUNTER (OUTPATIENT)
Dept: CT IMAGING | Facility: HOSPITAL | Age: 57
Discharge: HOME | End: 2017-10-24
Attending: OBSTETRICS & GYNECOLOGY | Admitting: OBSTETRICS & GYNECOLOGY

## 2017-10-24 DIAGNOSIS — R10.2 PELVIC PAIN: ICD-10-CM

## 2017-10-24 LAB — CREAT BLDA-MCNC: 0.9 MG/DL (ref 0.6–1.3)

## 2017-10-24 PROCEDURE — 0 IOPAMIDOL 61 % SOLUTION: Performed by: OBSTETRICS & GYNECOLOGY

## 2017-10-24 PROCEDURE — 82565 ASSAY OF CREATININE: CPT

## 2017-10-24 PROCEDURE — 72193 CT PELVIS W/DYE: CPT

## 2017-10-24 RX ADMIN — IOPAMIDOL 85 ML: 612 INJECTION, SOLUTION INTRAVENOUS at 11:17

## 2017-10-30 ENCOUNTER — CONSULT (OUTPATIENT)
Dept: OBSTETRICS AND GYNECOLOGY | Age: 57
End: 2017-10-30

## 2017-10-30 DIAGNOSIS — Z91.89 INCREASED RISK OF BREAST CANCER: ICD-10-CM

## 2017-10-30 DIAGNOSIS — Z12.39 SCREENING BREAST EXAMINATION: Primary | ICD-10-CM

## 2017-10-30 PROCEDURE — 99213 OFFICE O/P EST LOW 20 MIN: CPT | Performed by: OBSTETRICS & GYNECOLOGY

## 2017-10-30 NOTE — PROGRESS NOTES
Subjective   Peggy Winter is a 57 y.o. female is being seen today for No chief complaint on file.  .    History of Present Illness  Patient is here to discuss her positive mutation for an abnormal gene.  She is positive for an [facility] which elevates her risk for colon and breast cancer.  I went over the results with her page by page discussed everything told her her risk lifetime of breast is 15% which is higher than the normal population.  We talked about getting mammograms every year getting a 3-D mammogram which will order today she is due for mammogram.  And we talked about getting colonoscopies every 5 years.  She'll be due next year and she has a copy of the report she will take to her gastroenterologist.  And then discussed the [employer] breast cancer risk calculation which is 21.2% which would qualify her for a MRI every 6 months she is which is what she would like to do so in 6 months will contact each other and we will proceed with an MRI.  DELMI answered patient understood everything and I'll see her back for an annual check  The following portions of the patient's history were reviewed and updated as appropriate: allergies, current medications, past family history, past medical history, past social history, past surgical history and problem list.    There were no vitals filed for this visit.      PAST MEDICAL HISTORY  Past Medical History:   Diagnosis Date   • Anxiety    • Cataract    • Degenerative disc disease, lumbar    • Depression    • Edema     HANDS AND LEGS   • Fatigue    • Fibromyalgia    • Heartburn    • Hemorrhoids    • HTN (hypertension)    • Hypothyroid    • Joint pain    • Osteoarthritis    • PONV (postoperative nausea and vomiting)    • Restless leg syndrome    • Urge and stress incontinence      OB History     No data available        Past Surgical History:   Procedure Laterality Date   • COLONOSCOPY     • DILATATION AND CURETTAGE     • FOOT SURGERY Left     X 2   • GASTRIC SLEEVE  LAPAROSCOPIC N/A 9/26/2016    Procedure: GASTRIC SLEEVE LAPAROSCOPIC;  Surgeon: Yuniel Orantes Jr., MD;  [facility];  Service:    • HYSTERECTOMY     • LAPAROSCOPIC CHOLECYSTECTOMY     • NERVE ROOT EXPLORATION BACK     • TONSILLECTOMY     • TUBAL ABDOMINAL LIGATION       Family History   Problem Relation Age of Onset   • Sleep apnea Mother    • Cancer Mother    • Sleep apnea Father    • Cancer Father    • Diabetes Brother    • Heart attack Brother    • Hypertension Brother    • Obesity Maternal Grandmother    • Diabetes Maternal Grandmother    • Hypertension Maternal Grandmother    • Heart attack Maternal Grandmother    • Heart attack Maternal Grandfather      History   Smoking Status   • Never Smoker   Smokeless Tobacco   • Not on file       Current Outpatient Prescriptions:   •  Calcium-Phosphorus-Vitamin D (CALCIUM GUMMIES) 250-100-500 MG-MG-UNIT chewable tablet, Chew 1 tablet daily., Disp: , Rfl:   •  clonazePAM (KlonoPIN) 2 MG tablet, Take 2 mg by mouth 2 (two) times a day., Disp: , Rfl:   •  cyanocobalamin 100 MCG tablet, Take 1 tablet by mouth daily., Disp: , Rfl:   •  cyclobenzaprine (FLEXERIL) 10 MG tablet, Take 10 mg by mouth as needed., Disp: , Rfl:   •  FLUoxetine (PROzac) 20 MG capsule, Take 60 mg by mouth., Disp: , Rfl:   •  gabapentin (NEURONTIN) 300 MG capsule, TAKE ONE CAPSULE BY MOUTH THREE TIMES DAILY, Disp: , Rfl:   •  HYDROcodone-acetaminophen (VICODIN) 5-500 MG per tablet, Take 1 tablet by mouth Every 6 (Six) Hours As Needed., Disp: , Rfl:   •  levothyroxine (SYNTHROID, LEVOTHROID) 100 MCG tablet, Take 100 mcg by mouth Daily., Disp: , Rfl:   •  pantoprazole (PROTONIX) 20 MG EC tablet, Take 20 mg by mouth Daily., Disp: , Rfl:   •  POTASSIUM PO, Take 1 tablet by mouth daily., Disp: , Rfl:   •  triamterene-hydrochlorothiazide (DYAZIDE) 37.5-25 MG per capsule, Take 1 capsule by mouth Every Morning., Disp: , Rfl:     There is no immunization history on file for this  patient.    Review of Systems  Negative  Objective   Physical Exam  Well-developed well-nourished white female no acute distress    Assessment/Plan   Diagnoses and all orders for this visit:    Screening breast examination  -     Mammo Screening Digital Tomosynthesis Bilateral With CAD; Future    Increased risk of breast cancer      See above

## 2017-12-05 ENCOUNTER — HOSPITAL ENCOUNTER (OUTPATIENT)
Dept: MAMMOGRAPHY | Facility: HOSPITAL | Age: 57
Discharge: HOME | End: 2017-12-05
Attending: OBSTETRICS & GYNECOLOGY | Admitting: OBSTETRICS & GYNECOLOGY

## 2017-12-05 DIAGNOSIS — Z12.39 SCREENING BREAST EXAMINATION: ICD-10-CM

## 2017-12-05 PROCEDURE — 77063 BREAST TOMOSYNTHESIS BI: CPT

## 2017-12-05 PROCEDURE — G0202 SCR MAMMO BI INCL CAD: HCPCS

## 2018-04-13 ENCOUNTER — OFFICE VISIT (OUTPATIENT)
Dept: BARIATRICS/WEIGHT MGMT | Facility: CLINIC | Age: 58
End: 2018-04-13

## 2018-04-13 VITALS
TEMPERATURE: 98.2 F | HEIGHT: 68 IN | HEART RATE: 57 BPM | RESPIRATION RATE: 16 BRPM | SYSTOLIC BLOOD PRESSURE: 96 MMHG | BODY MASS INDEX: 26.37 KG/M2 | WEIGHT: 174 LBS | DIASTOLIC BLOOD PRESSURE: 56 MMHG

## 2018-04-13 DIAGNOSIS — E66.3 OVERWEIGHT (BMI 25.0-29.9): Primary | ICD-10-CM

## 2018-04-13 DIAGNOSIS — K21.9 GASTROESOPHAGEAL REFLUX DISEASE WITHOUT ESOPHAGITIS: ICD-10-CM

## 2018-04-13 DIAGNOSIS — Z71.3 DIETARY COUNSELING: ICD-10-CM

## 2018-04-13 PROCEDURE — 99213 OFFICE O/P EST LOW 20 MIN: CPT | Performed by: SURGERY

## 2018-04-13 NOTE — PROGRESS NOTES
[facility] BARIATRIC SURGERY  [address]  [facility] [id number] [phone]  [address]  [facility] [address] [phone]  Dept: [phone]  4/13/2018      Peggy Winter.  [id number]  [id number]  1960  female      Chief Complaint   Patient presents with   • Follow-up     1.5 year followup [facility] Post-Op Bariatric Surgery:   Peggy Winter is status post Laparoscopic Sleeve procedure, performed on 9/26/16     HPI:   Today's weight is 78.9 kg (174 lb) pounds, today's BMI is Body mass index is 26.46 kg/m²., she has a  loss of 2 pounds since the last visit and her weight loss since surgery is 104 pounds. The patient reports a decreased portion size and loss of appetite.      Peggy Winter denies nausea, vomiting, dysphagia, abdominal pain or heartburn.     Diet and Exercise: Diet history reviewed and discussed with the patient. Weight loss/gains to date discussed with the patient. The patient states they are eating 100 grams of protein per day. She reports eating 3 meals per day, a typical portion size of 1 cup, eating 2 snacks per day, drinking 5 or more 8-oz. glasses of water per day, no carbonated beverage consumption and exercising regularly.     Patient doing very well without any complaints.  She is concentrating on eating lean protein and vegetables.  She is exercising with core exercising as well as yoga.    Supplements: Bariatric vitamins per gastric sleeve.     Review of Systems   All other systems reviewed and are negative.      Patient Active Problem List   Diagnosis   • GERD (gastroesophageal reflux disease)   • Essential hypertension   • Chronic fatigue   • Edema   • Multiple joint pain   • Dupuytren's disease   • Depression with anxiety   • Other specified hypothyroidism   • Borderline hyperlipidemia   • Multiple gastric polyps   • Dietary counseling   • Dizziness   • Overweight (BMI 25.0-29.9)   • Alopecia   •  Pelvic pain   • Increased risk of breast cancer       Past Medical History:   Diagnosis Date   • Anxiety    • Cataract    • Degenerative disc disease, lumbar    • Depression    • Edema     HANDS AND LEGS   • Fatigue    • Fibromyalgia    • Heartburn    • Hemorrhoids    • HTN (hypertension)    • Hypothyroid    • Joint pain    • Osteoarthritis    • PONV (postoperative nausea and vomiting)    • Restless leg syndrome    • Urge and stress incontinence        The following portions of the patient's history were reviewed and updated as appropriate: allergies, current medications, past family history, past medical history, past social history, past surgical history and problem list.    Vitals:    04/13/18 0846   BP: 96/56   Pulse: 57   Resp: 16   Temp: 98.2 °F (36.8 °C)       Physical Exam   Constitutional: She is oriented to person, place, and time. She appears well-nourished.   HENT:   Head: Normocephalic and atraumatic.   Mouth/Throat: Oropharynx is clear and moist.   Eyes: Conjunctivae and EOM are normal. Pupils are equal, round, and reactive to light. No scleral icterus.   Neck: Normal range of motion. Neck supple. No thyromegaly present.   Cardiovascular: Normal rate and regular rhythm.    Pulmonary/Chest: Effort normal and breath sounds normal.   Abdominal: Soft. Bowel sounds are normal. She exhibits no distension and no mass. There is no tenderness. There is no rebound and no guarding. No hernia.   Musculoskeletal: Normal range of motion.   Lymphadenopathy:     She has no cervical adenopathy.   Neurological: She is alert and oriented to person, place, and time. No cranial nerve deficit. Coordination normal.   Skin: Skin is warm and dry. No erythema.   Psychiatric: She has a normal mood and affect. Her behavior is normal.   Vitals reviewed.        Assessment:   Post-op, the patient 1-1/2 year status post laparoscopic sleeve gastrectomy who is doing very well.  She has lost 104 pounds and would like to lose another 10  pounds.  Current BMI of 26.5.  She went through her diet and exercise routine is doing everything like she should.  She occasionally eat baked chips which she will keep that at a minimal.  I did instruct her to read the book titled food.  We discussed cleaning eating and she'll continue the exercise.  She also will continue with her Metamucil which has resolved her constipation..     Encounter Diagnoses   Name Primary?   • Overweight (BMI 25.0-29.9) Yes   • Dietary counseling    • Gastroesophageal reflux disease without esophagitis        Plan:     Encouraged patient to be sure to get plenty of lean protein per day through small frequent meals all with a protein source.   Activity restrictions: none.   Recommended patient be sure to get at least 70 grams of protein per day by eating small, frequent meals all with high lean protein choices. Be sure to limit/cut back on daily carbohydrate intake. Discussed with the patient the recommended amount of water per day to intake- half of body weight in ounces. Reviewed vitamin requirements. Be sure to do routine exercise, 150 minutes per week minimum, including both cardio and strength training.     Instructions / Recommendations: dietary counseling recommended, recommended a daily protein intake of  grams, vitamin supplement(s) recommended, recommended exercising at least 150 minutes per week, behavior modifications recommended and instructed to call the office for concerns, questions, or problems.     The patient was instructed to follow up in 6 months .     The patient was counseled regarding. Total time spent face to face was 20 minutes and 15 minutes was spent counseling.

## 2018-05-25 ENCOUNTER — TELEPHONE (OUTPATIENT)
Dept: OBSTETRICS AND GYNECOLOGY | Age: 58
End: 2018-05-25

## 2018-05-25 DIAGNOSIS — Z15.01 GENETIC PREDISPOSITION TO BREAST CANCER: Primary | ICD-10-CM

## 2018-05-25 NOTE — TELEPHONE ENCOUNTER
I called pt and she said yes she still does want the breast MRI.  Preferably at [facility].

## 2018-07-13 ENCOUNTER — APPOINTMENT (OUTPATIENT)
Dept: MRI IMAGING | Facility: HOSPITAL | Age: 58
End: 2018-07-13
Attending: OBSTETRICS & GYNECOLOGY

## 2018-07-20 ENCOUNTER — HOSPITAL ENCOUNTER (OUTPATIENT)
Dept: MRI IMAGING | Facility: HOSPITAL | Age: 58
Discharge: HOME | End: 2018-07-20
Attending: OBSTETRICS & GYNECOLOGY | Admitting: OBSTETRICS & GYNECOLOGY

## 2018-07-20 DIAGNOSIS — Z15.01 GENETIC PREDISPOSITION TO BREAST CANCER: ICD-10-CM

## 2018-07-20 LAB — CREAT BLDA-MCNC: 0.7 MG/DL (ref 0.6–1.3)

## 2018-07-20 PROCEDURE — C8908 MRI W/O FOL W/CONT, BREAST,: HCPCS

## 2018-07-20 PROCEDURE — 82565 ASSAY OF CREATININE: CPT

## 2018-07-20 PROCEDURE — A9577 INJ MULTIHANCE: HCPCS | Performed by: OBSTETRICS & GYNECOLOGY

## 2018-07-20 PROCEDURE — 0 GADOBENATE DIMEGLUMINE 529 MG/ML SOLUTION: Performed by: OBSTETRICS & GYNECOLOGY

## 2018-07-20 PROCEDURE — 0159T HC MRI BREAST COMPUTER ANALYSIS: CPT

## 2018-07-20 RX ADMIN — GADOBENATE DIMEGLUMINE 20 ML: 529 INJECTION, SOLUTION INTRAVENOUS at 16:45

## 2018-07-24 ENCOUNTER — TELEPHONE (OUTPATIENT)
Dept: OBSTETRICS AND GYNECOLOGY | Age: 58
End: 2018-07-24

## 2018-07-24 NOTE — TELEPHONE ENCOUNTER
----- Message from Higinio Araiza MD sent at 7/24/2018  8:20 AM EDT -----  Patient MRI is all normal

## 2019-01-23 ENCOUNTER — TELEPHONE (OUTPATIENT)
Dept: OBSTETRICS AND GYNECOLOGY | Age: 59
End: 2019-01-23

## 2019-02-19 ENCOUNTER — TELEPHONE (OUTPATIENT)
Dept: BARIATRICS/WEIGHT MGMT | Facility: CLINIC | Age: 59
End: 2019-02-19

## 2019-02-19 ENCOUNTER — LAB (OUTPATIENT)
Dept: LAB | Facility: HOSPITAL | Age: 59
End: 2019-02-19

## 2019-02-19 ENCOUNTER — OFFICE VISIT (OUTPATIENT)
Dept: BARIATRICS/WEIGHT MGMT | Facility: CLINIC | Age: 59
End: 2019-02-19

## 2019-02-19 VITALS
BODY MASS INDEX: 26.83 KG/M2 | HEART RATE: 54 BPM | SYSTOLIC BLOOD PRESSURE: 112 MMHG | TEMPERATURE: 97.4 F | RESPIRATION RATE: 18 BRPM | DIASTOLIC BLOOD PRESSURE: 62 MMHG | HEIGHT: 68 IN | WEIGHT: 177 LBS

## 2019-02-19 DIAGNOSIS — E66.3 OVERWEIGHT (BMI 25.0-29.9): Primary | ICD-10-CM

## 2019-02-19 DIAGNOSIS — K21.9 GASTROESOPHAGEAL REFLUX DISEASE WITHOUT ESOPHAGITIS: ICD-10-CM

## 2019-02-19 DIAGNOSIS — R53.82 CHRONIC FATIGUE: ICD-10-CM

## 2019-02-19 DIAGNOSIS — K59.00 CONSTIPATION, UNSPECIFIED CONSTIPATION TYPE: ICD-10-CM

## 2019-02-19 DIAGNOSIS — E66.3 OVERWEIGHT (BMI 25.0-29.9): ICD-10-CM

## 2019-02-19 DIAGNOSIS — Z71.3 DIETARY COUNSELING: ICD-10-CM

## 2019-02-19 LAB
25(OH)D3 SERPL-MCNC: 108.7 NG/ML (ref 30–100)
ALBUMIN SERPL-MCNC: 4.4 G/DL (ref 3.5–5.2)
ALBUMIN/GLOB SERPL: 1.8 G/DL
ALP SERPL-CCNC: 59 U/L (ref 39–117)
ALT SERPL W P-5'-P-CCNC: 38 U/L (ref 1–33)
ANION GAP SERPL CALCULATED.3IONS-SCNC: 12.5 MMOL/L
AST SERPL-CCNC: 16 U/L (ref 1–32)
BILIRUB SERPL-MCNC: 0.5 MG/DL (ref 0.1–1.2)
BUN BLD-MCNC: 27 MG/DL (ref 6–20)
BUN/CREAT SERPL: 38.6 (ref 7–25)
CALCIUM SPEC-SCNC: 9.4 MG/DL (ref 8.6–10.5)
CHLORIDE SERPL-SCNC: 98 MMOL/L (ref 98–107)
CO2 SERPL-SCNC: 28.5 MMOL/L (ref 22–29)
CREAT BLD-MCNC: 0.7 MG/DL (ref 0.57–1)
FERRITIN SERPL-MCNC: 19 NG/ML (ref 13–150)
FOLATE SERPL-MCNC: >20 NG/ML (ref 4.78–24.2)
GFR SERPL CREATININE-BSD FRML MDRD: 86 ML/MIN/1.73
GLOBULIN UR ELPH-MCNC: 2.4 GM/DL
GLUCOSE BLD-MCNC: 81 MG/DL (ref 65–99)
IRON 24H UR-MRATE: 75 MCG/DL (ref 37–145)
POTASSIUM BLD-SCNC: 3.7 MMOL/L (ref 3.5–5.2)
PROT SERPL-MCNC: 6.8 G/DL (ref 6–8.5)
SODIUM BLD-SCNC: 139 MMOL/L (ref 136–145)

## 2019-02-19 PROCEDURE — 82746 ASSAY OF FOLIC ACID SERUM: CPT

## 2019-02-19 PROCEDURE — 80053 COMPREHEN METABOLIC PANEL: CPT

## 2019-02-19 PROCEDURE — 82728 ASSAY OF FERRITIN: CPT

## 2019-02-19 PROCEDURE — 83921 ORGANIC ACID SINGLE QUANT: CPT

## 2019-02-19 PROCEDURE — 84134 ASSAY OF PREALBUMIN: CPT

## 2019-02-19 PROCEDURE — 84425 ASSAY OF VITAMIN B-1: CPT

## 2019-02-19 PROCEDURE — 36415 COLL VENOUS BLD VENIPUNCTURE: CPT

## 2019-02-19 PROCEDURE — 82306 VITAMIN D 25 HYDROXY: CPT

## 2019-02-19 PROCEDURE — 99213 OFFICE O/P EST LOW 20 MIN: CPT | Performed by: NURSE PRACTITIONER

## 2019-02-19 PROCEDURE — 83540 ASSAY OF IRON: CPT

## 2019-02-19 RX ORDER — MINOCYCLINE HYDROCHLORIDE 50 MG/1
CAPSULE ORAL DAILY
COMMUNITY
Start: 2018-04-21 | End: 2021-06-03

## 2019-02-19 RX ORDER — IBUPROFEN 800 MG/1
TABLET ORAL
COMMUNITY
Start: 2018-10-22

## 2019-02-19 RX ORDER — FLUTICASONE PROPIONATE 50 MCG
SPRAY, SUSPENSION (ML) NASAL
COMMUNITY
Start: 2019-02-18

## 2019-02-19 NOTE — TELEPHONE ENCOUNTER
Spoke to patient regarding vitamin D levels. Told her to stop taking any vitamin D and calcium supplements. Patient gave verbal understanding. Told her to call us back with any questions or concerns.       ----- Message from ZAN Flowers sent at 2/19/2019  2:08 PM EST -----  Please advise patient that her vitamin D is very high. She is to stop all vitamin D and calcium supplementation, push fluids.

## 2019-02-19 NOTE — PROGRESS NOTES
[facility] BARIATRIC SURGERY  [address]  [facility] [address] [phone]  [address]  [facility] [id number] [phone]  Dept: [phone]  2/19/2019      Peggy Winter.  [id number]  [id number]  1960  female      Chief Complaint   Patient presents with   • Follow-up     2 year sleeve       [facility] Post-Op Bariatric Surgery:   Peggy Winter is status post Laparoscopic Sleeve procedure, performed on 9/26/16     HPI:   Today's weight is 80.3 kg (177 lb) pounds, today's BMI is Body mass index is 26.92 kg/m²., she has a  gain of 3 pounds since the last visit and her weight loss since surgery is 101 pounds. The patient reports a decreased portion size and loss of appetite.      Peggy Winter denies nausea, vomiting, dysphagia, abdominal pain or heartburn.     Diet and Exercise: Diet history reviewed and discussed with the patient. Weight loss/gains to date discussed with the patient. The patient states they are eating 90 grams of protein per day. She reports eating 3 meals per day, a typical portion size of 1/2 cup, eating 1 snacks per day, drinking 5-6 or more 8-oz. glasses of water per day, no carbonated beverage consumption and exercising regularly- [employer] with cardio and strength 3-4 days per week.    She hasn't been able to exercise due to retinol detachment that has been on and off again for 6 months or so. She is really missing her yoga. She recently lost her ex-.    Her goal is closer to 168lbs.     She has been taking metamucil to try to help prevent constipation. She doesn't take it every day. She has decreased her water intake since she hasn't been getting to the gym as much. She reports that she usually goes every day, but hasn't gone since last Friday.     Supplements: DEANNA MTV with iron.     Review of Systems   Constitutional: Positive for appetite change. Negative for fatigue and unexpected weight change.   HENT:  Negative.    Eyes: Negative.    Respiratory: Negative.    Cardiovascular: Negative.  Negative for leg swelling.   Gastrointestinal: Positive for constipation. Negative for abdominal distention, abdominal pain, diarrhea, nausea and vomiting.   Genitourinary: Negative for difficulty urinating, frequency and urgency.   Musculoskeletal: Negative for back pain.   Skin: Negative.    Psychiatric/Behavioral: Negative.    All other systems reviewed and are negative.      Patient Active Problem List   Diagnosis   • GERD (gastroesophageal reflux disease)   • Essential hypertension   • Chronic fatigue   • Edema   • Multiple joint pain   • Dupuytren's disease   • Depression with anxiety   • Other specified hypothyroidism   • Borderline hyperlipidemia   • Multiple gastric polyps   • Dietary counseling   • Dizziness   • Overweight (BMI 25.0-29.9)   • Alopecia   • Pelvic pain   • Increased risk of breast cancer   • Constipation       Past Medical History:   Diagnosis Date   • Anxiety    • Cataract    • Degenerative disc disease, lumbar    • Depression    • Edema     HANDS AND LEGS   • Fatigue    • Fibromyalgia    • Heartburn    • Hemorrhoids    • HTN (hypertension)    • Hypothyroid    • Joint pain    • Osteoarthritis    • PONV (postoperative nausea and vomiting)    • Restless leg syndrome    • Urge and stress incontinence        The following portions of the patient's history were reviewed and updated as appropriate: allergies, current medications, past family history, past medical history, past social history, past surgical history and problem list.    Vitals:    02/19/19 1004   BP: 112/62   Pulse: 54   Resp: 18   Temp: 97.4 °F (36.3 °C)       Physical Exam   Constitutional: She appears well-developed and well-nourished.   Neck: No thyromegaly present.   Cardiovascular: Normal rate, regular rhythm and normal heart sounds.   Pulmonary/Chest: Effort normal and breath sounds normal. No respiratory distress. She has no wheezes.    Abdominal: Soft. Bowel sounds are normal. She exhibits no distension. There is no tenderness. There is no guarding. No hernia.   Musculoskeletal: She exhibits no edema or tenderness.   Neurological: She is alert.   Skin: Skin is warm and dry. No rash noted. No erythema.   Psychiatric: She has a normal mood and affect. Her behavior is normal.   Nursing note and vitals reviewed.      Assessment:   Post-op, the patient is doing well.     Encounter Diagnoses   Name Primary?   • Overweight (BMI 25.0-29.9) Yes   • Dietary counseling    • Chronic fatigue    • Gastroesophageal reflux disease without esophagitis    • Constipation, unspecified constipation type        Plan:   Discussed adding mirilax over dietary fiber for 1-2 weeks to help prevent constipation as well as increasing water intake. Discussed adding gentle strength training back to her routine using resistance bands.  Encouraged patient to be sure to get plenty of lean protein per day through small frequent meals all with a protein source.   Activity restrictions: none.   Recommended patient be sure to get at least 70 grams of protein per day by eating small, frequent meals all with high lean protein choices. Be sure to limit/cut back on daily carbohydrate intake. Discussed with the patient the recommended amount of water per day to intake- half of body weight in ounces. Reviewed vitamin requirements. Be sure to do routine exercise, 150 minutes per week minimum, including both cardio and strength training.     Instructions / Recommendations: dietary counseling recommended, recommended a daily protein intake of  grams, vitamin supplement(s) recommended, recommended exercising at least 150 minutes per week, behavior modifications recommended and instructed to call the office for concerns, questions, or problems.     The patient was instructed to follow up in 1 year .     The patient was counseled regarding exercise, protein intake, lab work, fluid intake.  Total time spent face to face was 20 minutes and 15 minutes was spent counseling.

## 2019-02-20 LAB — PREALB SERPL-MCNC: 30.1 MG/DL (ref 20–40)

## 2019-02-22 LAB
Lab: NORMAL
METHYLMALONATE SERPL-SCNC: 177 NMOL/L (ref 0–378)
VIT B1 BLD-SCNC: 123.8 NMOL/L (ref 66.5–200)

## 2019-03-19 ENCOUNTER — OFFICE VISIT (OUTPATIENT)
Dept: OBSTETRICS AND GYNECOLOGY | Age: 59
End: 2019-03-19

## 2019-03-19 VITALS
SYSTOLIC BLOOD PRESSURE: 110 MMHG | BODY MASS INDEX: 27.13 KG/M2 | WEIGHT: 179 LBS | DIASTOLIC BLOOD PRESSURE: 64 MMHG | HEIGHT: 68 IN

## 2019-03-19 DIAGNOSIS — Z00.00 ANNUAL PHYSICAL EXAM: Primary | ICD-10-CM

## 2019-03-19 DIAGNOSIS — F43.9 STRESS: ICD-10-CM

## 2019-03-19 DIAGNOSIS — Z12.39 BREAST CANCER SCREENING, HIGH RISK PATIENT: ICD-10-CM

## 2019-03-19 PROBLEM — M17.12 PRIMARY OSTEOARTHRITIS OF LEFT KNEE: Status: ACTIVE | Noted: 2018-08-08

## 2019-03-19 PROCEDURE — 99396 PREV VISIT EST AGE 40-64: CPT | Performed by: OBSTETRICS & GYNECOLOGY

## 2019-03-19 NOTE — PROGRESS NOTES
Subjective   Peggy Winter is a 59 y.o. female is being seen today for   Chief Complaint   Patient presents with   • Gynecologic Exam     AE today. HYST no HRT.  MRI breast 2018. (family hx of breast cancer).  C-scope 2017 = polyps.  Dexa .  Spouse passed away in 2018. Surgery x3 for detached retina.    .    History of Present Illness  Patient is here for annual check with a couple of little problems.  Her ex that she has been  from couple years and the divorce was final in July passed away in 2 months later mostly from alcoholism.  She does not have any significant other right now but she does complain of a much harder ability to have orgasms.  So we talked about that in detail with stress aging different things.  Stress comes from her recurrent retinal detachment in her right eye.  Is been worked on laser etc. but they are not sure that were fixed for that and she is worried Again she could lose her eyesight.  Her boys are doing well there is nothing new to report in the family.  She does need a mammogram at this point alternating with her MRIs.  Otherwise she is doing well  The following portions of the patient's history were reviewed and updated as appropriate: allergies, current medications, past family history, past medical history, past social history, past surgical history and problem list.    Vitals:    19 1337   BP: 110/64       PAST MEDICAL HISTORY  Past Medical History:   Diagnosis Date   • Anxiety    • Cataract    • Degenerative disc disease, lumbar    • Depression    • Edema     HANDS AND LEGS   • Fatigue    • Fibromyalgia    • Heartburn    • Hemorrhoids    • HTN (hypertension)    • Hypothyroid    • Joint pain    • Osteoarthritis    • PONV (postoperative nausea and vomiting)    • Restless leg syndrome    • Retinal detachment 2019   • Urge and stress incontinence      OB History      Para Term  AB Living    2 2 2          SAB TAB Ectopic Molar Multiple Live  Births                       Past Surgical History:   Procedure Laterality Date   • COLONOSCOPY     • DILATATION AND CURETTAGE     • FOOT SURGERY Left     X 2   • GASTRIC SLEEVE LAPAROSCOPIC N/A 9/26/2016    Procedure: GASTRIC SLEEVE LAPAROSCOPIC;  Surgeon: Yuniel Orantes Jr., MD;  Location: [facility] OR [facility];  Service:    • HYSTERECTOMY     • LAPAROSCOPIC CHOLECYSTECTOMY     • NERVE ROOT EXPLORATION BACK     • RETINAL DETACHMENT REPAIR  2019    x3   • TONSILLECTOMY     • TUBAL ABDOMINAL LIGATION       Family History   Problem Relation Age of Onset   • Sleep apnea Mother    • Cancer Mother    • Breast cancer Mother    • Sleep apnea Father    • Cancer Father    • Diabetes Brother    • Heart attack Brother    • Hypertension Brother    • Obesity Maternal Grandmother    • Diabetes Maternal Grandmother    • Hypertension Maternal Grandmother    • Heart attack Maternal Grandmother    • Heart attack Maternal Grandfather    • Breast cancer Maternal Aunt    • No Known Problems Son    • No Known Problems Son      Social History     Tobacco Use   Smoking Status Never Smoker   Smokeless Tobacco Never Used       Current Outpatient Medications:   •  Calcium-Phosphorus-Vitamin D (CALCIUM GUMMIES) 250-100-500 MG-MG-UNIT chewable tablet, Chew 1 tablet daily., Disp: , Rfl:   •  Cholecalciferol 1000 units capsule, Take 1 capsule by mouth Daily., Disp: , Rfl:   •  clonazePAM (KlonoPIN) 2 MG tablet, Take 2 mg by mouth 2 (two) times a day., Disp: , Rfl:   •  cyanocobalamin 100 MCG tablet, Take 1 tablet by mouth daily., Disp: , Rfl:   •  cyclobenzaprine (FLEXERIL) 10 MG tablet, Take 10 mg by mouth as needed., Disp: , Rfl:   •  FLUoxetine (PROzac) 20 MG capsule, Take 60 mg by mouth., Disp: , Rfl:   •  fluticasone (FLONASE) 50 MCG/ACT nasal spray, SHAKE LIQUID AND USE 2 SPRAYS IN EACH NOSTRIL DAILY, Disp: , Rfl:   •  gabapentin (NEURONTIN) 300 MG capsule, TAKE ONE CAPSULE BY MOUTH THREE TIMES DAILY, Disp: , Rfl:   •   HYDROcodone-acetaminophen (VICODIN) 5-500 MG per tablet, Take 1 tablet by mouth Every 6 (Six) Hours As Needed., Disp: , Rfl:   •  ibuprofen (ADVIL,MOTRIN) 800 MG tablet, TAKE 1 TABLET BY MOUTH EVERY 8 HOURS AS NEEDED FOR PAIN, Disp: , Rfl:   •  levothyroxine (SYNTHROID, LEVOTHROID) 100 MCG tablet, Take 100 mcg by mouth Daily., Disp: , Rfl:   •  minocycline (MINOCIN,DYNACIN) 50 MG capsule, Daily., Disp: , Rfl:   •  pantoprazole (PROTONIX) 20 MG EC tablet, Take 20 mg by mouth Daily., Disp: , Rfl:   •  POTASSIUM PO, Take 1 tablet by mouth daily., Disp: , Rfl:   •  triamterene-hydrochlorothiazide (DYAZIDE) 37.5-25 MG per capsule, Take 1 capsule by mouth Every Morning., Disp: , Rfl:     There is no immunization history on file for this patient.    Review of Systems   Constitutional: Negative for chills, fatigue, fever and unexpected weight change.   Eyes: Positive for visual disturbance.   Respiratory: Negative for shortness of breath and wheezing.    Cardiovascular: Negative for chest pain.   Gastrointestinal: Negative for abdominal distention, abdominal pain, blood in stool, constipation, diarrhea and nausea.   Genitourinary: Negative for difficulty urinating, dyspareunia, dysuria, frequency, hematuria, menstrual problem, pelvic pain, urgency and vaginal discharge.   Skin: Negative for rash.       Objective   Physical Exam   Constitutional: She is oriented to person, place, and time. Vital signs are normal. She appears well-developed and well-nourished.   Neck: No thyromegaly present.   Cardiovascular: Normal rate, regular rhythm and normal heart sounds.   Pulmonary/Chest: Effort normal. Right breast exhibits no inverted nipple, no mass, no nipple discharge, no skin change and no tenderness. Left breast exhibits no inverted nipple, no mass, no nipple discharge, no skin change and no tenderness. Breasts are symmetrical. There is no breast swelling.   Abdominal: Soft.   Genitourinary: Vagina normal. No breast tenderness,  discharge or bleeding. Pelvic exam was performed with patient supine. No labial fusion. There is no rash, tenderness, lesion or injury on the right labia. There is no rash, tenderness, lesion or injury on the left labia. Cervix exhibits no motion tenderness, no discharge and no friability. Right adnexum displays no mass, no tenderness and no fullness. Left adnexum displays no mass, no tenderness and no fullness.   Neurological: She is alert and oriented to person, place, and time.   Psychiatric: She has a normal mood and affect.   Vitals reviewed.        Assessment/Plan   Peggy was seen today for gynecologic exam.    Diagnoses and all orders for this visit:    Annual physical exam    Stress    Breast cancer screening, high risk patient  -     Mammo Screening Digital Tomosynthesis Bilateral With CAD; Future      My exam was normal today post hysterectomy.  No Pap smear today.  Again we will order the mammogram ultimately 6 months with a MRI.  Bone density 17 is up-to-date and colonoscopy in 17 showed polyps and she is on a 5-year plan.  Her bowels and bladder work well and we talked about exercise back in a year

## 2019-04-30 ENCOUNTER — APPOINTMENT (OUTPATIENT)
Dept: MAMMOGRAPHY | Facility: HOSPITAL | Age: 59
End: 2019-04-30

## 2019-05-21 ENCOUNTER — HOSPITAL ENCOUNTER (OUTPATIENT)
Dept: MAMMOGRAPHY | Facility: HOSPITAL | Age: 59
Discharge: HOME | End: 2019-05-21
Admitting: OBSTETRICS & GYNECOLOGY

## 2019-05-21 DIAGNOSIS — Z12.39 BREAST CANCER SCREENING, HIGH RISK PATIENT: ICD-10-CM

## 2019-05-21 PROCEDURE — 77063 BREAST TOMOSYNTHESIS BI: CPT

## 2019-05-21 PROCEDURE — 77067 SCR MAMMO BI INCL CAD: CPT

## 2019-05-22 ENCOUNTER — TRANSCRIBE ORDERS (OUTPATIENT)
Dept: ADMINISTRATIVE | Facility: HOSPITAL | Age: 59
End: 2019-05-22

## 2019-05-22 DIAGNOSIS — R92.8 ABNORMAL MAMMOGRAM: Primary | ICD-10-CM

## 2019-06-05 ENCOUNTER — HOSPITAL ENCOUNTER (OUTPATIENT)
Dept: MAMMOGRAPHY | Facility: HOSPITAL | Age: 59
Discharge: HOME | End: 2019-06-05
Admitting: FAMILY MEDICINE

## 2019-06-05 ENCOUNTER — HOSPITAL ENCOUNTER (OUTPATIENT)
Dept: ULTRASOUND IMAGING | Facility: HOSPITAL | Age: 59
Discharge: HOME | End: 2019-06-05

## 2019-06-05 DIAGNOSIS — R92.8 ABNORMAL MAMMOGRAM: ICD-10-CM

## 2019-06-05 PROCEDURE — 77065 DX MAMMO INCL CAD UNI: CPT

## 2019-06-05 PROCEDURE — 76642 ULTRASOUND BREAST LIMITED: CPT

## 2020-03-10 ENCOUNTER — OFFICE VISIT (OUTPATIENT)
Dept: BARIATRICS/WEIGHT MGMT | Facility: CLINIC | Age: 60
End: 2020-03-10

## 2020-03-10 VITALS
TEMPERATURE: 97.9 F | SYSTOLIC BLOOD PRESSURE: 100 MMHG | DIASTOLIC BLOOD PRESSURE: 49 MMHG | WEIGHT: 187 LBS | BODY MASS INDEX: 28.34 KG/M2 | HEIGHT: 68 IN | HEART RATE: 61 BPM | RESPIRATION RATE: 18 BRPM

## 2020-03-10 DIAGNOSIS — R53.82 CHRONIC FATIGUE: ICD-10-CM

## 2020-03-10 DIAGNOSIS — E66.3 OVERWEIGHT (BMI 25.0-29.9): Primary | ICD-10-CM

## 2020-03-10 DIAGNOSIS — Z90.3 HISTORY OF SLEEVE GASTRECTOMY: ICD-10-CM

## 2020-03-10 DIAGNOSIS — K59.00 CONSTIPATION, UNSPECIFIED CONSTIPATION TYPE: ICD-10-CM

## 2020-03-10 DIAGNOSIS — Z71.3 DIETARY COUNSELING: ICD-10-CM

## 2020-03-10 DIAGNOSIS — K21.9 GASTROESOPHAGEAL REFLUX DISEASE WITHOUT ESOPHAGITIS: ICD-10-CM

## 2020-03-10 PROCEDURE — 99214 OFFICE O/P EST MOD 30 MIN: CPT | Performed by: SURGERY

## 2020-03-10 RX ORDER — CETIRIZINE HYDROCHLORIDE 10 MG/1
10 TABLET ORAL
COMMUNITY
Start: 2019-08-19 | End: 2020-08-18

## 2020-03-10 RX ORDER — PREDNISOLONE ACETATE 10 MG/ML
1 SUSPENSION/ DROPS OPHTHALMIC 4 TIMES DAILY
COMMUNITY
Start: 2019-01-21

## 2020-03-10 NOTE — PROGRESS NOTES
[facility] BARIATRIC SURGERY  [address] [address]  [phone]  [address] [id number] [phone]  Dept: [phone]  3/10/2020      Peggy Winter.  [id number]  [id number]  1960  female      Chief Complaint   Patient presents with   • Follow-up     sleeve follow up       BH Post-Op Bariatric Surgery:   Peggy Winter is status post Laparoscopic Sleeve procedure, performed on 9/26/16     HPI:   Today's weight is 84.8 kg (187 lb) pounds, today's BMI is Body mass index is 28.44 kg/m²., she has a  gain of 10 pounds since the last visit and her weight loss since surgery is 88 pounds. The patient reports a decreased portion size and loss of appetite.      Peggy Winter denies nausea vomiting fever chills chest pain shortness of air or abdominal pain and reports fatigue and constipation which is controlled with her MiraLAX     Diet and Exercise: Diet history reviewed and discussed with the patient. Weight loss/gains to date discussed with the patient. The patient states they are eating 90 grams of protein per day. She reports eating 2-3 meals per day, a typical portion size of 1 cup, eating 1 snacks per day, drinking 5 or more 8-oz. glasses of water per day, no carbonated beverage consumption and exercising regularly.     Supplements: Multivitamin with iron, B12.     Review of Systems   Constitutional: Positive for fatigue.   Gastrointestinal: Positive for constipation.   Musculoskeletal: Positive for arthralgias.   All other systems reviewed and are negative.      Patient Active Problem List   Diagnosis   • GERD (gastroesophageal reflux disease)   • Essential hypertension   • Chronic fatigue   • Edema   • Multiple joint pain   • Dupuytren's disease   • Depression with anxiety   • Other specified hypothyroidism   • Borderline hyperlipidemia   • Multiple gastric polyps   • Dietary counseling   • Dizziness   •  Overweight (BMI 25.0-29.9)   • Alopecia   • Pelvic pain   • Increased risk of breast cancer   • Constipation   • Right retinal detachment   • Primary osteoarthritis of left knee   • Stress   • History of sleeve gastrectomy       Past Medical History:   Diagnosis Date   • Anxiety    • Cataract    • Degenerative disc disease, lumbar    • Depression    • Edema     HANDS AND LEGS   • Fatigue    • Fibromyalgia    • Heartburn    • Hemorrhoids    • HTN (hypertension)    • Hypothyroid    • Joint pain    • Osteoarthritis    • PONV (postoperative nausea and vomiting)    • Restless leg syndrome    • Retinal detachment 2019   • Urge and stress incontinence        The following portions of the patient's history were reviewed and updated as appropriate: allergies, current medications, past family history, past medical history, past social history, past surgical history and problem list.    Vitals:    03/10/20 1345   BP: 100/49   Pulse: 61   Resp: 18   Temp: 97.9 °F (36.6 °C)       Physical Exam   Constitutional: She is oriented to person, place, and time. She appears well-nourished.   HENT:   Head: Normocephalic and atraumatic.   Mouth/Throat: Oropharynx is clear and moist.   Eyes: Pupils are equal, round, and reactive to light. Conjunctivae and EOM are normal. No scleral icterus.   Neck: Normal range of motion. Neck supple. No thyromegaly present.   Cardiovascular: Normal rate and regular rhythm.   Pulmonary/Chest: Effort normal and breath sounds normal.   Abdominal: Soft. Bowel sounds are normal. She exhibits no distension and no mass. There is no tenderness. There is no rebound and no guarding. No hernia.   Musculoskeletal: Normal range of motion.   Lymphadenopathy:     She has no cervical adenopathy.   Neurological: She is alert and oriented to person, place, and time. No cranial nerve deficit. Coordination normal.   Skin: Skin is warm and dry. No erythema.   Psychiatric: She has a normal mood and affect. Her behavior is  normal.   Vitals reviewed.      Assessment:   Post-op, the patient status post laparoscopic sleeve gastrectomy September 2016.  She is afebrile and hemodynamically stable and abdomen benign on exam.  She does take over-the-counter Prilosec for heartburn which is controlled with the medication.  She she has gained some weight since last visit but has been doing very well.  She did go through her daily routine and is eating clean and exercising at the gym.  She would like to get down to 170 and will follow-up in 3 months and plan to hit that target.  We discussed intermittent fasting which she would like to do.  She will continue with her exercise routine.  Blood pressure was normal today.  She states that her primary care doctor checks lab which I did give her a handout on which labs we would like drawn on an annual basis.  She will contact her office if he does not order..     Encounter Diagnoses   Name Primary?   • Overweight (BMI 25.0-29.9) Yes   • Gastroesophageal reflux disease without esophagitis    • History of sleeve gastrectomy    • Dietary counseling    • Chronic fatigue    • Constipation, unspecified constipation type        Plan:     Encouraged patient to be sure to get plenty of lean protein per day through small frequent meals all with a protein source.   Activity restrictions: none.   Recommended patient be sure to get at least 70 grams of protein per day by eating small, frequent meals all with high lean protein choices. Be sure to limit/cut back on daily carbohydrate intake. Discussed with the patient the recommended amount of water per day to intake- half of body weight in ounces. Reviewed vitamin requirements. Be sure to do routine exercise, 150 minutes per week minimum, including both cardio and strength training.     Instructions / Recommendations: dietary counseling recommended, recommended a daily protein intake of  grams, vitamin supplement(s) recommended, recommended exercising at least  150 minutes per week, behavior modifications recommended and instructed to call the office for concerns, questions, or problems.     The patient was instructed to follow up in 3 months.     The patient was counseled regarding. Total time spent face to face was 25 minutes and 20 minutes was spent counseling.

## 2020-03-10 NOTE — PATIENT INSTRUCTIONS
Metamucil one Tablespoon in 8 oz of water then follow with another 8 oz of water in the morning or evening

## 2020-06-05 DIAGNOSIS — E03.8 OTHER SPECIFIED HYPOTHYROIDISM: ICD-10-CM

## 2020-06-05 DIAGNOSIS — R53.82 CHRONIC FATIGUE: ICD-10-CM

## 2020-06-05 DIAGNOSIS — Z98.84 S/P LAPAROSCOPIC SLEEVE GASTRECTOMY: ICD-10-CM

## 2020-06-05 DIAGNOSIS — Z90.3 HISTORY OF SLEEVE GASTRECTOMY: Primary | ICD-10-CM

## 2020-06-05 DIAGNOSIS — R60.9 EDEMA, UNSPECIFIED TYPE: ICD-10-CM

## 2020-06-05 DIAGNOSIS — E66.3 OVERWEIGHT (BMI 25.0-29.9): ICD-10-CM

## 2020-06-05 DIAGNOSIS — I10 ESSENTIAL HYPERTENSION: ICD-10-CM

## 2020-06-05 DIAGNOSIS — M25.50 MULTIPLE JOINT PAIN: ICD-10-CM

## 2020-06-05 DIAGNOSIS — F41.8 DEPRESSION WITH ANXIETY: ICD-10-CM

## 2020-06-05 DIAGNOSIS — E78.5 BORDERLINE HYPERLIPIDEMIA: ICD-10-CM

## 2020-06-05 DIAGNOSIS — K21.9 GASTROESOPHAGEAL REFLUX DISEASE WITHOUT ESOPHAGITIS: ICD-10-CM

## 2020-06-08 ENCOUNTER — LAB (OUTPATIENT)
Dept: LAB | Facility: HOSPITAL | Age: 60
End: 2020-06-08

## 2020-06-08 DIAGNOSIS — R60.9 EDEMA, UNSPECIFIED TYPE: ICD-10-CM

## 2020-06-08 DIAGNOSIS — F41.8 DEPRESSION WITH ANXIETY: ICD-10-CM

## 2020-06-08 DIAGNOSIS — I10 ESSENTIAL HYPERTENSION: ICD-10-CM

## 2020-06-08 DIAGNOSIS — R53.82 CHRONIC FATIGUE: ICD-10-CM

## 2020-06-08 DIAGNOSIS — E03.8 OTHER SPECIFIED HYPOTHYROIDISM: ICD-10-CM

## 2020-06-08 DIAGNOSIS — Z90.3 HISTORY OF SLEEVE GASTRECTOMY: ICD-10-CM

## 2020-06-08 DIAGNOSIS — M25.50 MULTIPLE JOINT PAIN: ICD-10-CM

## 2020-06-08 DIAGNOSIS — E78.5 BORDERLINE HYPERLIPIDEMIA: ICD-10-CM

## 2020-06-08 DIAGNOSIS — K21.9 GASTROESOPHAGEAL REFLUX DISEASE WITHOUT ESOPHAGITIS: ICD-10-CM

## 2020-06-08 LAB
25(OH)D3 SERPL-MCNC: 52.6 NG/ML (ref 30–100)
ALBUMIN SERPL-MCNC: 4.5 G/DL (ref 3.5–5.2)
ALBUMIN/GLOB SERPL: 2 G/DL
ALP SERPL-CCNC: 77 U/L (ref 39–117)
ALT SERPL W P-5'-P-CCNC: 32 U/L (ref 1–33)
ANION GAP SERPL CALCULATED.3IONS-SCNC: 13 MMOL/L (ref 5–15)
AST SERPL-CCNC: 23 U/L (ref 1–32)
BASOPHILS # BLD AUTO: 0.09 10*3/MM3 (ref 0–0.2)
BASOPHILS NFR BLD AUTO: 0.8 % (ref 0–1.5)
BILIRUB SERPL-MCNC: 0.5 MG/DL (ref 0.2–1.2)
BUN BLD-MCNC: 21 MG/DL (ref 6–20)
BUN/CREAT SERPL: 25.3 (ref 7–25)
CALCIUM SPEC-SCNC: 9.1 MG/DL (ref 8.6–10.5)
CHLORIDE SERPL-SCNC: 98 MMOL/L (ref 98–107)
CO2 SERPL-SCNC: 26 MMOL/L (ref 22–29)
CREAT BLD-MCNC: 0.83 MG/DL (ref 0.57–1)
DEPRECATED RDW RBC AUTO: 41.5 FL (ref 37–54)
EOSINOPHIL # BLD AUTO: 0.19 10*3/MM3 (ref 0–0.4)
EOSINOPHIL NFR BLD AUTO: 1.7 % (ref 0.3–6.2)
ERYTHROCYTE [DISTWIDTH] IN BLOOD BY AUTOMATED COUNT: 12.5 % (ref 12.3–15.4)
FERRITIN SERPL-MCNC: 28.3 NG/ML (ref 13–150)
FOLATE SERPL-MCNC: >20 NG/ML (ref 4.78–24.2)
GFR SERPL CREATININE-BSD FRML MDRD: 70 ML/MIN/1.73
GLOBULIN UR ELPH-MCNC: 2.3 GM/DL
GLUCOSE BLD-MCNC: 120 MG/DL (ref 65–99)
HBA1C MFR BLD: 5.76 % (ref 4.8–5.6)
HCT VFR BLD AUTO: 40.3 % (ref 34–46.6)
HGB BLD-MCNC: 13.6 G/DL (ref 12–15.9)
IMM GRANULOCYTES # BLD AUTO: 0.05 10*3/MM3 (ref 0–0.05)
IMM GRANULOCYTES NFR BLD AUTO: 0.5 % (ref 0–0.5)
IRON 24H UR-MRATE: 115 MCG/DL (ref 37–145)
LYMPHOCYTES # BLD AUTO: 2.16 10*3/MM3 (ref 0.7–3.1)
LYMPHOCYTES NFR BLD AUTO: 19.5 % (ref 19.6–45.3)
MAGNESIUM SERPL-MCNC: 1.7 MG/DL (ref 1.6–2.6)
MCH RBC QN AUTO: 30.8 PG (ref 26.6–33)
MCHC RBC AUTO-ENTMCNC: 33.7 G/DL (ref 31.5–35.7)
MCV RBC AUTO: 91.4 FL (ref 79–97)
MONOCYTES # BLD AUTO: 0.62 10*3/MM3 (ref 0.1–0.9)
MONOCYTES NFR BLD AUTO: 5.6 % (ref 5–12)
NEUTROPHILS # BLD AUTO: 7.98 10*3/MM3 (ref 1.7–7)
NEUTROPHILS NFR BLD AUTO: 71.9 % (ref 42.7–76)
NRBC BLD AUTO-RTO: 0 /100 WBC (ref 0–0.2)
PLATELET # BLD AUTO: 342 10*3/MM3 (ref 140–450)
PMV BLD AUTO: 10.6 FL (ref 6–12)
POTASSIUM BLD-SCNC: 3.6 MMOL/L (ref 3.5–5.2)
PREALB SERPL-MCNC: 31.3 MG/DL (ref 20–40)
PROT SERPL-MCNC: 6.8 G/DL (ref 6–8.5)
PTH-INTACT SERPL-MCNC: 32 PG/ML (ref 15–65)
RBC # BLD AUTO: 4.41 10*6/MM3 (ref 3.77–5.28)
SODIUM BLD-SCNC: 137 MMOL/L (ref 136–145)
WBC NRBC COR # BLD: 11.09 10*3/MM3 (ref 3.4–10.8)

## 2020-06-08 PROCEDURE — 83970 ASSAY OF PARATHORMONE: CPT

## 2020-06-08 PROCEDURE — 84590 ASSAY OF VITAMIN A: CPT

## 2020-06-08 PROCEDURE — 82728 ASSAY OF FERRITIN: CPT

## 2020-06-08 PROCEDURE — 82306 VITAMIN D 25 HYDROXY: CPT

## 2020-06-08 PROCEDURE — 83921 ORGANIC ACID SINGLE QUANT: CPT

## 2020-06-08 PROCEDURE — 36415 COLL VENOUS BLD VENIPUNCTURE: CPT

## 2020-06-08 PROCEDURE — 83540 ASSAY OF IRON: CPT

## 2020-06-08 PROCEDURE — 80053 COMPREHEN METABOLIC PANEL: CPT

## 2020-06-08 PROCEDURE — 83735 ASSAY OF MAGNESIUM: CPT

## 2020-06-08 PROCEDURE — 84134 ASSAY OF PREALBUMIN: CPT

## 2020-06-08 PROCEDURE — 84425 ASSAY OF VITAMIN B-1: CPT

## 2020-06-08 PROCEDURE — 83036 HEMOGLOBIN GLYCOSYLATED A1C: CPT

## 2020-06-08 PROCEDURE — 85025 COMPLETE CBC W/AUTO DIFF WBC: CPT

## 2020-06-08 PROCEDURE — 82746 ASSAY OF FOLIC ACID SERUM: CPT

## 2020-06-09 ENCOUNTER — LAB (OUTPATIENT)
Dept: LAB | Facility: HOSPITAL | Age: 60
End: 2020-06-09

## 2020-06-09 DIAGNOSIS — I10 ESSENTIAL HYPERTENSION, MALIGNANT: Primary | ICD-10-CM

## 2020-06-09 PROCEDURE — 84597 ASSAY OF VITAMIN K: CPT

## 2020-06-13 LAB
Lab: NORMAL
METHYLMALONATE SERPL-SCNC: 188 NMOL/L (ref 0–378)
VIT A SERPL-MCNC: 72.4 UG/DL (ref 20.1–62)
VIT B1 BLD-SCNC: 151.2 NMOL/L (ref 66.5–200)

## 2020-06-15 LAB — PHYTONADIONE SERPL-MCNC: 0.51 NG/ML (ref 0.13–1.88)

## 2020-06-16 NOTE — PROGRESS NOTES
Pt states she stopped taking the bariatric vitamins about a year ago. Pt states she does take centrum women's gummies 3 a day, vitamin b12, magnesium, extra iron, fish oil and zinc. Pt states she takes other vitamins but was not home to look.

## 2020-06-22 ENCOUNTER — TELEPHONE (OUTPATIENT)
Dept: BARIATRICS/WEIGHT MGMT | Facility: CLINIC | Age: 60
End: 2020-06-22

## 2020-06-22 NOTE — TELEPHONE ENCOUNTER
I sent a message to you regarding this pt but for some reason I cant find your reply. Pt is calling about her lab results and mostly about her Vitamin A levels. Pt is asking what could be making her bruise so easily also. Pt states  She is taking Potassium, magnesium, iron, fish oil (has zinc and magnesium in it) Vitamin B 12, and Vitamin D.

## 2020-06-22 NOTE — TELEPHONE ENCOUNTER
Oh okay, I just found out the pt is taking fish oil that she states has zinc and magnesium in it. Does the pt need to stop taking the fish oil?

## 2021-04-26 ENCOUNTER — OFFICE VISIT (OUTPATIENT)
Dept: BARIATRICS/WEIGHT MGMT | Facility: CLINIC | Age: 61
End: 2021-04-26

## 2021-05-04 NOTE — PROGRESS NOTES
"Peggy Winter.  [id number]  [id number]  1960  female    Bariatric Nutrition Assessment Follow-Up    Procedure Performed:  Sleeve  Date of Surgery: 9/26/16    Highest Weight:  276 lbs  Current Weight:  187 lbs  Goal Weight:  170 lbs    Assessment:  Patient reports she achieved goal weight of 170 lbs and has gradually started to gain weight back. Breakfast: coffee w/ splenda and sugar free creamer; S: protein shake; L: 2 cheese sticks, crackers; D: protein, vegetable (broccoli, green beans, salad). Exercising about 3-4 times/wk; enjoys group exercise but has been unable to do that during pandemic. Boyfriend has a gym and she has started going there some.      Stated Problem Areas/Concerns:  Weight gain 4.5 years post gastric sleeve    Recommendations/Plan:  Reinforced importance of high protein diet and encouraged 70+ grams per day. Discussed high protein meal and snack ideas. She cleans houses for work and \"snacky\" convenient lunches work best. Suggested tracking intake using a phone karly to get an idea of where she is at currently. Focus on >64 oz fluid per day. Continue to increase exercise. Offered continued follow up for accountability and she agreed.     Follow-Up:  Telephone appointment scheduled in 1 month      Electronically signed by:  Tana Lujan RD   09:30 EDT04/26/2021     "

## 2021-05-24 ENCOUNTER — OFFICE VISIT (OUTPATIENT)
Dept: BARIATRICS/WEIGHT MGMT | Facility: CLINIC | Age: 61
End: 2021-05-24

## 2021-05-24 NOTE — PROGRESS NOTES
"Peggy Winter.  [id number]  [id number]  1960  female    Bariatric Nutrition Assessment Follow-Up    Procedure Performed:  Sleeve  Date of Surgery: 9/26/16     Highest Weight:  276 lbs  Current Weight:  187 lbs  Goal Weight:  170 lbs     Assessment:  Patient reports she is maintaining weight but not seeing progress. She would like to lose another 20 lbs but is struggling with hunger and cravings. Schedule has been busy and she is having a hard time getting to the gym. Cleans houses for work, so relatively active daily. B: coffee with collagen, creamer and sweet n low; L: low carb wrap with turkey, swiss cheese and grimaldo; D: may just be snacks such as protein shake, chips or pork rinds; may have sweets. Once a week may have 1-2 alcoholic beverages. She doesn't feel like she's eating much, but weight is not changing. Feeling extremely discouraged and hopeless.      Stated Problem Areas/Concerns:  Inability to lose weight; increased hunger and cravings     Recommendations/Plan:  Reinforced importance of high protein diet and encouraged 70+ grams per day and recommended high protein dinner and snacks. Limit \"slider\" foods such as chips that won't provide full or satisfied feeling or have a small portion as part of a high protein meal. Increase solid proteins, vegetables and high fiber/whole grain carbohydrates. Drink 64+ oz calorie free fluids daily. Encouraged patience and persistence; at this point weight loss is naturally much slower than immediately post-op. Suggested scheduling an appointment with APRN for lab work and possible medical options to help with cravings; last office visit was over a year ago.      Follow-Up:  Telephone appointment scheduled in 2 weeks for accountability/support      Electronically signed by:  Tana Lujan, RD   15:39 EDT05/24/2021     "

## 2021-05-25 ENCOUNTER — OFFICE VISIT (OUTPATIENT)
Dept: BARIATRICS/WEIGHT MGMT | Facility: CLINIC | Age: 61
End: 2021-05-25

## 2021-05-25 ENCOUNTER — HOSPITAL ENCOUNTER (OUTPATIENT)
Dept: CARDIOLOGY | Facility: HOSPITAL | Age: 61
Discharge: HOME | End: 2021-05-25

## 2021-05-25 ENCOUNTER — LAB (OUTPATIENT)
Dept: LAB | Facility: HOSPITAL | Age: 61
End: 2021-05-25

## 2021-05-25 ENCOUNTER — TRANSCRIBE ORDERS (OUTPATIENT)
Dept: CARDIOLOGY | Facility: HOSPITAL | Age: 61
End: 2021-05-25

## 2021-05-25 VITALS
RESPIRATION RATE: 18 BRPM | DIASTOLIC BLOOD PRESSURE: 65 MMHG | BODY MASS INDEX: 28.79 KG/M2 | SYSTOLIC BLOOD PRESSURE: 130 MMHG | TEMPERATURE: 97.4 F | HEIGHT: 68 IN | HEART RATE: 58 BPM | WEIGHT: 190 LBS

## 2021-05-25 DIAGNOSIS — Z90.3 HISTORY OF SLEEVE GASTRECTOMY: ICD-10-CM

## 2021-05-25 DIAGNOSIS — Z71.3 DIETARY COUNSELING: ICD-10-CM

## 2021-05-25 DIAGNOSIS — E66.3 OVERWEIGHT (BMI 25.0-29.9): ICD-10-CM

## 2021-05-25 DIAGNOSIS — R53.82 CHRONIC FATIGUE: ICD-10-CM

## 2021-05-25 DIAGNOSIS — E66.3 OVERWEIGHT (BMI 25.0-29.9): Primary | ICD-10-CM

## 2021-05-25 DIAGNOSIS — Z01.811 PRE-OP CHEST EXAM: Primary | ICD-10-CM

## 2021-05-25 LAB
25(OH)D3 SERPL-MCNC: 75.6 NG/ML (ref 30–100)
ALBUMIN SERPL-MCNC: 4.4 G/DL (ref 3.5–5.2)
ALBUMIN/GLOB SERPL: 1.9 G/DL
ALP SERPL-CCNC: 72 U/L (ref 39–117)
ALT SERPL W P-5'-P-CCNC: 27 U/L (ref 1–33)
ANION GAP SERPL CALCULATED.3IONS-SCNC: 11.3 MMOL/L (ref 5–15)
AST SERPL-CCNC: 24 U/L (ref 1–32)
BASOPHILS # BLD AUTO: 0.06 10*3/MM3 (ref 0–0.2)
BASOPHILS NFR BLD AUTO: 0.7 % (ref 0–1.5)
BILIRUB SERPL-MCNC: 0.4 MG/DL (ref 0–1.2)
BUN SERPL-MCNC: 29 MG/DL (ref 8–23)
BUN/CREAT SERPL: 29.6 (ref 7–25)
CALCIUM SPEC-SCNC: 9.3 MG/DL (ref 8.6–10.5)
CHLORIDE SERPL-SCNC: 101 MMOL/L (ref 98–107)
CO2 SERPL-SCNC: 26.7 MMOL/L (ref 22–29)
CREAT SERPL-MCNC: 0.98 MG/DL (ref 0.57–1)
DEPRECATED RDW RBC AUTO: 43 FL (ref 37–54)
EOSINOPHIL # BLD AUTO: 0.22 10*3/MM3 (ref 0–0.4)
EOSINOPHIL NFR BLD AUTO: 2.4 % (ref 0.3–6.2)
ERYTHROCYTE [DISTWIDTH] IN BLOOD BY AUTOMATED COUNT: 12.9 % (ref 12.3–15.4)
FERRITIN SERPL-MCNC: 30.7 NG/ML (ref 13–150)
FOLATE SERPL-MCNC: >20 NG/ML (ref 4.78–24.2)
GFR SERPL CREATININE-BSD FRML MDRD: 58 ML/MIN/1.73
GLOBULIN UR ELPH-MCNC: 2.3 GM/DL
GLUCOSE SERPL-MCNC: 101 MG/DL (ref 65–99)
HCT VFR BLD AUTO: 39.1 % (ref 34–46.6)
HGB BLD-MCNC: 13.2 G/DL (ref 12–15.9)
IMM GRANULOCYTES # BLD AUTO: 0.03 10*3/MM3 (ref 0–0.05)
IMM GRANULOCYTES NFR BLD AUTO: 0.3 % (ref 0–0.5)
IRON 24H UR-MRATE: 59 MCG/DL (ref 37–145)
LYMPHOCYTES # BLD AUTO: 2.05 10*3/MM3 (ref 0.7–3.1)
LYMPHOCYTES NFR BLD AUTO: 22.3 % (ref 19.6–45.3)
MCH RBC QN AUTO: 31 PG (ref 26.6–33)
MCHC RBC AUTO-ENTMCNC: 33.8 G/DL (ref 31.5–35.7)
MCV RBC AUTO: 91.8 FL (ref 79–97)
MONOCYTES # BLD AUTO: 0.62 10*3/MM3 (ref 0.1–0.9)
MONOCYTES NFR BLD AUTO: 6.7 % (ref 5–12)
NEUTROPHILS NFR BLD AUTO: 6.23 10*3/MM3 (ref 1.7–7)
NEUTROPHILS NFR BLD AUTO: 67.6 % (ref 42.7–76)
NRBC BLD AUTO-RTO: 0 /100 WBC (ref 0–0.2)
PLATELET # BLD AUTO: 312 10*3/MM3 (ref 140–450)
PMV BLD AUTO: 10.7 FL (ref 6–12)
POTASSIUM SERPL-SCNC: 3.8 MMOL/L (ref 3.5–5.2)
PREALB SERPL-MCNC: 27.9 MG/DL (ref 20–40)
PROT SERPL-MCNC: 6.7 G/DL (ref 6–8.5)
QT INTERVAL: 458 MS
RBC # BLD AUTO: 4.26 10*6/MM3 (ref 3.77–5.28)
SODIUM SERPL-SCNC: 139 MMOL/L (ref 136–145)
WBC # BLD AUTO: 9.21 10*3/MM3 (ref 3.4–10.8)

## 2021-05-25 PROCEDURE — 84425 ASSAY OF VITAMIN B-1: CPT

## 2021-05-25 PROCEDURE — 99214 OFFICE O/P EST MOD 30 MIN: CPT | Performed by: NURSE PRACTITIONER

## 2021-05-25 PROCEDURE — 85025 COMPLETE CBC W/AUTO DIFF WBC: CPT

## 2021-05-25 PROCEDURE — 93005 ELECTROCARDIOGRAM TRACING: CPT

## 2021-05-25 PROCEDURE — 84134 ASSAY OF PREALBUMIN: CPT

## 2021-05-25 PROCEDURE — 83540 ASSAY OF IRON: CPT

## 2021-05-25 PROCEDURE — 83921 ORGANIC ACID SINGLE QUANT: CPT

## 2021-05-25 PROCEDURE — 93010 ELECTROCARDIOGRAM REPORT: CPT | Performed by: INTERNAL MEDICINE

## 2021-05-25 PROCEDURE — 82728 ASSAY OF FERRITIN: CPT

## 2021-05-25 PROCEDURE — 36415 COLL VENOUS BLD VENIPUNCTURE: CPT

## 2021-05-25 PROCEDURE — 80053 COMPREHEN METABOLIC PANEL: CPT

## 2021-05-25 PROCEDURE — 82306 VITAMIN D 25 HYDROXY: CPT

## 2021-05-25 PROCEDURE — 82746 ASSAY OF FOLIC ACID SERUM: CPT

## 2021-05-25 NOTE — PROGRESS NOTES
[facility] BARIATRIC SURGERY  [address] [facility] [address]  [facility] [address]  [phone]  [address] [facility] [address]  [facility] [id number] [phone]  Dept: [phone]  5/26/2021      Peggy Winter.  [id number]  [id number]  1960  female      Chief Complaint   Patient presents with   • Follow-up     5 YEAR SLEEVE/ GAINING WEIGHT       BH Post-Op Bariatric Surgery:   Peggy Winter is status post Laparoscopic Sleeve procedure, performed on 9/26/16.     HPI:   Today's weight is 86.2 kg (190 lb) pounds, today's BMI is Body mass index is 28.9 kg/m².,@ has a  gain of 3 pounds since the last visit and@ weight loss since surgery is 78 pounds. The patient reports a decreased portion size and loss of appetite.      Peggy Winter denies nausea, abdominal pain and reports increased hunger and struggling with food cravings.  She is mostly craving sweet, salty, and carbohydrate rich foods.  She has some heartburn which is controlled by omeprazole.  Her lowest weight was 170 pounds and she would like to get back to that wait.       Diet and Exercise: Diet history reviewed and discussed with the patient. Weight loss/gains to date discussed with the patient. The patient states they are eating 30 grams of protein per day. She reports eating 2-3 meals per day, a typical portion size of 1/2 cup, eating 2 snacks per day, drinking 5 or more 8-oz. glasses of water per day, no carbonated beverage consumption and exercising regularly.  Diet recall: B-coffee with protein, premier shake; L-turkey low carb wrap, chips; D-sometimes will skip, sometimes has a snack vs a meal; S-chips, cookies, soda 2 times weekly.    She has taken phentermine in the past with some success and would like to try that.  She has been meeting with our dietician, Tana, and has follow up appointments with her scheduled.      Supplements: MTV with iron, vitamin D, calcium, vitamin B1, vitamin B12.     Review  of Systems   Constitutional: Positive for fatigue.   Gastrointestinal: Positive for constipation.        Gerd symptoms     All other systems reviewed and are negative.      Patient Active Problem List   Diagnosis   • GERD (gastroesophageal reflux disease)   • Essential hypertension   • Chronic fatigue   • Edema   • Multiple joint pain   • Dupuytren's disease   • Depression with anxiety   • Other specified hypothyroidism   • Borderline hyperlipidemia   • Multiple gastric polyps   • Dietary counseling   • Dizziness   • Overweight (BMI 25.0-29.9)   • Alopecia   • Pelvic pain   • Increased risk of breast cancer   • Constipation   • Right retinal detachment   • Primary osteoarthritis of left knee   • Stress   • History of sleeve gastrectomy       Past Medical History:   Diagnosis Date   • Anxiety    • Cataract    • Degenerative disc disease, lumbar    • Depression    • Edema     HANDS AND LEGS   • Fatigue    • Fibromyalgia    • Heartburn    • Hemorrhoids    • HTN (hypertension)    • Hypothyroid    • Joint pain    • Osteoarthritis    • PONV (postoperative nausea and vomiting)    • Restless leg syndrome    • Retinal detachment 2019   • Urge and stress incontinence        The following portions of the patient's history were reviewed and updated as appropriate: allergies, current medications, past medical history, past surgical history and problem list.    Vitals:    05/25/21 1355   BP: 130/65   Pulse: 58   Resp: 18   Temp: 97.4 °F (36.3 °C)       Physical Exam  Constitutional:       Appearance: Normal appearance.   HENT:      Head: Normocephalic and atraumatic.   Eyes:      Extraocular Movements: Extraocular movements intact.      Conjunctiva/sclera: Conjunctivae normal.      Pupils: Pupils are equal, round, and reactive to light.   Cardiovascular:      Rate and Rhythm: Normal rate and regular rhythm.      Heart sounds: Normal heart sounds. No murmur heard.   No gallop.    Pulmonary:      Effort: Pulmonary effort is normal.  No respiratory distress.      Breath sounds: Normal breath sounds.   Abdominal:      General: Bowel sounds are normal. There is no distension.      Palpations: Abdomen is soft.      Tenderness: There is no abdominal tenderness. There is no guarding.   Musculoskeletal:         General: Normal range of motion.      Cervical back: Normal range of motion and neck supple.   Skin:     General: Skin is warm and dry.   Neurological:      General: No focal deficit present.      Mental Status: She is alert and oriented to person, place, and time.   Psychiatric:         Mood and Affect: Mood normal.         Behavior: Behavior normal.         Thought Content: Thought content normal.         Judgment: Judgment normal.         Assessment:   Post-op, the patient is doing well though struggling with weight regain and cravings.     Encounter Diagnoses   Name Primary?   • Overweight (BMI 25.0-29.9) Yes   • History of sleeve gastrectomy    • Dietary counseling    • Chronic fatigue        Plan:   Encouraged her to continue meeting with the dietician.  She is snacking and eating some high carb foods and should attempt to replace them with a high protein option.  Reviewed that the protein goal is for 70 grams of protein.    She would like to try phentermine and topamax.  Will check EKG first as previous one was from 2016.  Medication is pending on those results.  Will also check labwork.  Encouraged her to add fiber to her diet.  Can add through high fiber foods as well as fiber supplement such as Metamucil.    Encouraged patient to be sure to get plenty of lean protein per day through small frequent meals all with a protein source.   Activity restrictions: none.   Recommended patient be sure to get at least 70 grams of protein per day by eating small, frequent meals all with high lean protein choices. Be sure to limit/cut back on daily carbohydrate intake. Discussed with the patient the recommended amount of water per day to intake-  half of body weight in ounces. Reviewed vitamin requirements. Be sure to do routine exercise, 150 minutes per week minimum, including both cardio and strength training.     Instructions / Recommendations: dietary counseling recommended, recommended a daily protein intake of  grams, vitamin supplement(s) recommended, recommended exercising at least 150 minutes per week, behavior modifications recommended and instructed to call the office for concerns, questions, or problems.     The patient was instructed to follow up in 1 month .     Total time spent during this encounter today was 40 minutes.

## 2021-05-26 ENCOUNTER — TELEPHONE (OUTPATIENT)
Dept: BARIATRICS/WEIGHT MGMT | Facility: CLINIC | Age: 61
End: 2021-05-26

## 2021-05-26 DIAGNOSIS — R94.31 ABNORMAL EKG: Primary | ICD-10-CM

## 2021-05-26 RX ORDER — TOPIRAMATE 25 MG/1
TABLET ORAL
Qty: 42 TABLET | Refills: 1 | Status: SHIPPED | OUTPATIENT
Start: 2021-05-26 | End: 2021-05-26 | Stop reason: SDUPTHER

## 2021-05-26 RX ORDER — CLONAZEPAM 1 MG/1
1 TABLET ORAL 2 TIMES DAILY
COMMUNITY
Start: 2021-05-13

## 2021-05-26 RX ORDER — HYDROCODONE BITARTRATE AND ACETAMINOPHEN 7.5; 325 MG/1; MG/1
1 TABLET ORAL EVERY 8 HOURS PRN
COMMUNITY
Start: 2021-05-20 | End: 2021-12-28

## 2021-05-26 RX ORDER — TOPIRAMATE 25 MG/1
TABLET ORAL
Qty: 135 TABLET | Refills: 1 | Status: SHIPPED | OUTPATIENT
Start: 2021-05-26 | End: 2021-07-16

## 2021-05-26 RX ORDER — TOPIRAMATE 25 MG/1
TABLET ORAL
Qty: 135 TABLET | Refills: 0 | OUTPATIENT
Start: 2021-05-26

## 2021-05-26 NOTE — TELEPHONE ENCOUNTER
Patient was here yesterday, she had GS 9/26/16' Patient called stating she had her labs and EKG done yesterday and wants to know when weight loss meds could be called in. Can you review her EKG results when you get a chance.  Please Advise       Her lab work looks good so far with what we have back.  Her EKG looks abnormal.  There is the possibility that she has had an infarct in the past but this is new since her last EKG.  This should be followed up with cardiology.  I will place a referral.  If she already is established with a cardiologist, that is great.  We would need a note or access to a note that states it is ok from their standpoint to start phentermine.      We had discussed starting her on phentermine and topamax.  We can hold the phentermine until cardiology clearance but can go ahead and start the topamax.  It can help her with cravings as well as sleep at night.  I will send in a prescription for this medication and she can start in the meantime.  Will send in a prescription that she will need to ramp up the dosage.  All the instructions will be on the bottle.  She should take at night.  She should take 1 pill for 2 weeks and then 2 pills for 2 weeks until she comes back for a follow up in about a month.      If she has any questions, let me know and I can call her.   AC

## 2021-05-26 NOTE — TELEPHONE ENCOUNTER
I spoke to patient and discussed her EKG results with her. Patient will follow up with cardiology as requested. I instructed patient on when and how to take the Topamax prescription. Patient had no additional questions at this time.    Pharmacy was suppose to be changed in her chart but was not, I have edited the pharmacy on the original rx if you could please send again. Thanks!

## 2021-05-29 LAB — VIT B1 BLD-SCNC: 129.8 NMOL/L (ref 66.5–200)

## 2021-06-03 ENCOUNTER — OFFICE VISIT (OUTPATIENT)
Dept: CARDIOLOGY | Facility: CLINIC | Age: 61
End: 2021-06-03

## 2021-06-03 VITALS
HEART RATE: 64 BPM | WEIGHT: 191 LBS | HEIGHT: 67 IN | SYSTOLIC BLOOD PRESSURE: 112 MMHG | BODY MASS INDEX: 29.98 KG/M2 | DIASTOLIC BLOOD PRESSURE: 68 MMHG

## 2021-06-03 DIAGNOSIS — R94.31 ABNORMAL EKG: Primary | ICD-10-CM

## 2021-06-03 DIAGNOSIS — H40.9 GLAUCOMA OF BOTH EYES, UNSPECIFIED GLAUCOMA TYPE: ICD-10-CM

## 2021-06-03 DIAGNOSIS — E78.41 ELEVATED LIPOPROTEIN(A): ICD-10-CM

## 2021-06-03 DIAGNOSIS — Z82.49 FAMILY HISTORY OF PREMATURE CORONARY HEART DISEASE: ICD-10-CM

## 2021-06-03 DIAGNOSIS — E03.9 HYPOTHYROIDISM (ACQUIRED): ICD-10-CM

## 2021-06-03 PROCEDURE — 99204 OFFICE O/P NEW MOD 45 MIN: CPT | Performed by: INTERNAL MEDICINE

## 2021-06-03 PROCEDURE — 93000 ELECTROCARDIOGRAM COMPLETE: CPT | Performed by: INTERNAL MEDICINE

## 2021-06-03 NOTE — PROGRESS NOTES
"Chief Complaint  Slow Heart Rate    Subjective    History of Present Illness      I saw Peggy Winter today for cardiovascular evaluation.  She is a very pleasant 61-year-old female with no prior known cardiac disease history.  She has received the COVID-19 vaccine.  She is an active individual exercising aerobically 3-4 times per week.  She remains free of chest pain pressure tightness.  She does not report any significant or progressive dyspnea on exertion.  She sleeps with 1 pillow free of orthopnea or PND and no lower extremity edema.  She denies syncope near syncope or palpitations.  She does not have a history of snoring.    Ms. Winter has a history of obesity and has had greater than 100 pound weight reduction.  She wishes to lose further weight and has been considered for treatment with phentermine.  EKG was obtained 5/25/2021, prior to initiation, which reviewed by me shows sinus bradycardia at 49 bpm, prolonged HI interval at 230 ms, cannot exclude prior anteroseptal infarction age undetermined.    Cardiac risk factors are negative for hypertension diabetes or tobacco use.  She reports borderline hypertension and a family history of premature heart disease in her brother.    Past medical history hypothyroidism, status post gastric sleeve, status post cholecystectomy, history of a retinal detachment and glaucoma.    Family history positive for premature heart disease in her brother    Social history no tobacco use occasional alcohol use 2-3 caffeinated beverages daily    Objective   Vital Signs:   /68   Pulse 64   Ht 170.2 cm (67\")   Wt 86.6 kg (191 lb)   BMI 29.91 kg/m²     Constitutional:       Appearance: Well-developed and overweight.   Eyes:      Conjunctiva/sclera: Conjunctivae normal.      Pupils: Pupils are equal, round, and reactive to light.   HENT:      Head: Normocephalic and atraumatic.   Neck:      Thyroid: No thyromegaly.   Pulmonary:      Effort: Pulmonary effort is normal. No " respiratory distress.      Breath sounds: Normal breath sounds. No wheezing. No rales.   Cardiovascular:      Normal rate. Regular rhythm.      No gallop. No S3 and S4 gallop. Midsystolic click click.   Edema:     Peripheral edema absent.   Abdominal:      General: Bowel sounds are normal. There is no distension.      Palpations: Abdomen is soft. There is no abdominal mass.      Tenderness: There is no abdominal tenderness.   Musculoskeletal: Normal range of motion.      Cervical back: Neck supple. Skin:     General: Skin is warm and dry.      Findings: No erythema.   Neurological:      Mental Status: Alert and oriented to person, place, and time.         Result Review :     Common labs    Common Labsle 1/26/21 1/26/21 1/26/21 4/27/21 5/25/21 5/25/21    0917 0917 0917  1509 1509   Glucose      101 (A)   Glucose 74   70 (A)     BUN 17   28 (A)  29 (A)   Creatinine 0.6 (A)   0.9  0.98   eGFR Non African Am      58 (A)   Sodium 141   138  139   Potassium 4.5   4.2  3.8   Chloride 100   98  101   Calcium 9.5   10.0  9.3   Albumin 4.3   4.4  4.40   Total Bilirubin 0.6   0.7  0.4   Alkaline Phosphatase 88   71  72   AST (SGOT) 26   20  24   ALT (SGPT) 27   23  27   WBC   10.18  9.21    Hemoglobin   13.3  13.2    Hematocrit   43.8  39.1    Platelets   359  312    Hemoglobin A1C  6.0 (A)       (A) Abnormal value       Comments are available for some flowsheets but are not being displayed.         Fasting lipid profile 1/26/2021 revealed triglycerides 128, HDL 78,        ECG 12 Lead    Date/Time: 6/3/2021 9:32 AM  Performed by: Yifan Bartholomew MD  Authorized by: Yifan Bartholomew MD   Comparison: compared with previous ECG from 5/25/2021  Similar to previous ECG  Rhythm: sinus bradycardia  BPM: 51  Conduction: 1st degree AV block  Q waves: V1 and V2    Comments: Sinus bradycardia at 51 bpm, first-degree AV block, cannot exclude prior septal infarction age undetermined no significant change compared to previous EKG  from 5/25/2021              Assessment and Plan    1. Elevated lipoprotein(a)  Encourage low-cholesterol low saturated fat weight reduction diet    2. Abnormal EKG  Repeat EKG and obtain echocardiogram    3. Family history of premature coronary heart disease      4. Glaucoma of both eyes, unspecified glaucoma type      5. Hypothyroidism (acquired)      Peggy is free of angina and euvolemic.  I will obtain an echocardiogram to assess left ventricular contractility, diastolic function in the presence of regional wall motion abnormality as well as valvular function.  She is aerobically active.  I have counseled her on the importance of observing a low-cholesterol low saturated fat diet and recommended a target LDL is close to 100 as possible.  I will plan to see her in follow-up in 4 weeks sooner if needed.      I spent 45 minutes caring for Peggy on this date of service. This time includes time spent by me in the following activities:preparing for the visit, reviewing tests, performing a medically appropriate examination and/or evaluation , counseling and educating the patient/family/caregiver, ordering medications, tests, or procedures, documenting information in the medical record and care coordination  Follow Up 01  No follow-ups on file.  Patient was given instructions and counseling regarding her condition or for health maintenance advice. Please see specific information pulled into the AVS if appropriate.

## 2021-06-04 LAB
Lab: NORMAL
METHYLMALONATE SERPL-SCNC: 256 NMOL/L (ref 0–378)

## 2021-07-06 ENCOUNTER — HOSPITAL ENCOUNTER (OUTPATIENT)
Dept: CARDIOLOGY | Facility: HOSPITAL | Age: 61
Discharge: HOME | End: 2021-07-06
Admitting: INTERNAL MEDICINE

## 2021-07-06 VITALS
BODY MASS INDEX: 29.98 KG/M2 | WEIGHT: 191 LBS | DIASTOLIC BLOOD PRESSURE: 46 MMHG | HEIGHT: 67 IN | SYSTOLIC BLOOD PRESSURE: 106 MMHG

## 2021-07-06 DIAGNOSIS — R94.31 ABNORMAL EKG: ICD-10-CM

## 2021-07-06 DIAGNOSIS — Z82.49 FAMILY HISTORY OF PREMATURE CORONARY HEART DISEASE: ICD-10-CM

## 2021-07-06 LAB
AORTIC DIMENSIONLESS INDEX: 0.9 (DI)
BH CV ECHO MEAS - AO MAX PG: 4 MMHG
BH CV ECHO MEAS - AO MEAN PG (FULL): 1 MMHG
BH CV ECHO MEAS - AO MEAN PG: 2 MMHG
BH CV ECHO MEAS - AO ROOT AREA (BSA CORRECTED): 1.5
BH CV ECHO MEAS - AO ROOT AREA: 7.1 CM^2
BH CV ECHO MEAS - AO ROOT DIAM: 3 CM
BH CV ECHO MEAS - AO V2 MAX: 101.3 CM/SEC
BH CV ECHO MEAS - AO V2 MEAN: 67.4 CM/SEC
BH CV ECHO MEAS - AO V2 VTI: 21 CM
BH CV ECHO MEAS - AVA(I,A): 3.1 CM^2
BH CV ECHO MEAS - AVA(I,D): 3.1 CM^2
BH CV ECHO MEAS - BSA(HAYCOCK): 2 M^2
BH CV ECHO MEAS - BSA: 2 M^2
BH CV ECHO MEAS - BZI_BMI: 29.9 KILOGRAMS/M^2
BH CV ECHO MEAS - BZI_METRIC_HEIGHT: 170.2 CM
BH CV ECHO MEAS - BZI_METRIC_WEIGHT: 86.6 KG
BH CV ECHO MEAS - EDV(CUBED): 148.9 ML
BH CV ECHO MEAS - EDV(MOD-SP2): 123 ML
BH CV ECHO MEAS - EDV(MOD-SP4): 108 ML
BH CV ECHO MEAS - EDV(TEICH): 135.3 ML
BH CV ECHO MEAS - EF(CUBED): 71.2 %
BH CV ECHO MEAS - EF(MOD-BP): 54 %
BH CV ECHO MEAS - EF(MOD-SP2): 52.8 %
BH CV ECHO MEAS - EF(MOD-SP4): 54.6 %
BH CV ECHO MEAS - EF(TEICH): 62.4 %
BH CV ECHO MEAS - ESV(CUBED): 42.9 ML
BH CV ECHO MEAS - ESV(MOD-SP2): 58 ML
BH CV ECHO MEAS - ESV(MOD-SP4): 49 ML
BH CV ECHO MEAS - ESV(TEICH): 50.9 ML
BH CV ECHO MEAS - FS: 34 %
BH CV ECHO MEAS - IVS/LVPW: 1.1
BH CV ECHO MEAS - IVSD: 0.9 CM
BH CV ECHO MEAS - LAT PEAK E' VEL: 8.8 CM/SEC
BH CV ECHO MEAS - LV DIASTOLIC VOL/BSA (35-75): 54.5 ML/M^2
BH CV ECHO MEAS - LV MASS(C)D: 162.1 GRAMS
BH CV ECHO MEAS - LV MASS(C)DI: 81.8 GRAMS/M^2
BH CV ECHO MEAS - LV MAX PG: 2.7 MMHG
BH CV ECHO MEAS - LV MEAN PG: 1 MMHG
BH CV ECHO MEAS - LV SYSTOLIC VOL/BSA (12-30): 24.7 ML/M^2
BH CV ECHO MEAS - LV V1 MAX: 82 CM/SEC
BH CV ECHO MEAS - LV V1 MEAN: 53.9 CM/SEC
BH CV ECHO MEAS - LV V1 VTI: 20.6 CM
BH CV ECHO MEAS - LVIDD: 5.3 CM
BH CV ECHO MEAS - LVIDS: 3.5 CM
BH CV ECHO MEAS - LVLD AP2: 8 CM
BH CV ECHO MEAS - LVLD AP4: 8.4 CM
BH CV ECHO MEAS - LVLS AP2: 7.2 CM
BH CV ECHO MEAS - LVLS AP4: 6.7 CM
BH CV ECHO MEAS - LVOT AREA (M): 3.1 CM^2
BH CV ECHO MEAS - LVOT AREA: 3.1 CM^2
BH CV ECHO MEAS - LVOT DIAM: 2 CM
BH CV ECHO MEAS - LVPWD: 0.8 CM
BH CV ECHO MEAS - MED PEAK E' VEL: 7.4 CM/SEC
BH CV ECHO MEAS - MV A DUR: 0.13 SEC
BH CV ECHO MEAS - MV A MAX VEL: 77.5 CM/SEC
BH CV ECHO MEAS - MV DEC SLOPE: 298 CM/SEC^2
BH CV ECHO MEAS - MV DEC TIME: 144 SEC
BH CV ECHO MEAS - MV E MAX VEL: 83.4 CM/SEC
BH CV ECHO MEAS - MV E/A: 1.1
BH CV ECHO MEAS - MV MEAN PG: 1 MMHG
BH CV ECHO MEAS - MV P1/2T MAX VEL: 80.9 CM/SEC
BH CV ECHO MEAS - MV P1/2T: 79.5 MSEC
BH CV ECHO MEAS - MV V2 MEAN: 52.3 CM/SEC
BH CV ECHO MEAS - MV V2 VTI: 34.6 CM
BH CV ECHO MEAS - MVA P1/2T LCG: 2.7 CM^2
BH CV ECHO MEAS - MVA(P1/2T): 2.8 CM^2
BH CV ECHO MEAS - MVA(VTI): 1.9 CM^2
BH CV ECHO MEAS - PA ACC SLOPE: 583 CM/SEC^2
BH CV ECHO MEAS - PA ACC TIME: 0.14 SEC
BH CV ECHO MEAS - PA MAX PG: 2.8 MMHG
BH CV ECHO MEAS - PA PR(ACCEL): 15.6 MMHG
BH CV ECHO MEAS - PA V2 MAX: 83.5 CM/SEC
BH CV ECHO MEAS - RAP SYSTOLE: 3 MMHG
BH CV ECHO MEAS - RV MEAN PG: 1 MMHG
BH CV ECHO MEAS - RV V1 MEAN: 43.1 CM/SEC
BH CV ECHO MEAS - RV V1 VTI: 13.9 CM
BH CV ECHO MEAS - SI(AO): 74.9 ML/M^2
BH CV ECHO MEAS - SI(CUBED): 53.5 ML/M^2
BH CV ECHO MEAS - SI(LVOT): 32.6 ML/M^2
BH CV ECHO MEAS - SI(MOD-SP2): 32.8 ML/M^2
BH CV ECHO MEAS - SI(MOD-SP4): 29.8 ML/M^2
BH CV ECHO MEAS - SI(TEICH): 42.6 ML/M^2
BH CV ECHO MEAS - SV(AO): 148.4 ML
BH CV ECHO MEAS - SV(CUBED): 106 ML
BH CV ECHO MEAS - SV(LVOT): 64.7 ML
BH CV ECHO MEAS - SV(MOD-SP2): 65 ML
BH CV ECHO MEAS - SV(MOD-SP4): 59 ML
BH CV ECHO MEAS - SV(TEICH): 84.5 ML
BH CV ECHO MEAS - TAPSE (>1.6): 2.1 CM
BH CV ECHO MEASUREMENTS AVERAGE E/E' RATIO: 10.3
BH CV XLRA - RV BASE: 3.7 CM
BH CV XLRA - RV LENGTH: 7.5 CM
BH CV XLRA - RV MID: 2.7 CM
BH CV XLRA - TDI S': 10.2 CM/SEC
LEFT ATRIUM VOLUME INDEX: 25.3 ML/M2
MAXIMAL PREDICTED HEART RATE: 160 BPM
STRESS TARGET HR: 136 BPM

## 2021-07-06 PROCEDURE — 93306 TTE W/DOPPLER COMPLETE: CPT

## 2021-07-06 PROCEDURE — 93306 TTE W/DOPPLER COMPLETE: CPT | Performed by: INTERNAL MEDICINE

## 2021-07-06 PROCEDURE — 25010000002 PERFLUTREN (DEFINITY) 8.476 MG IN SODIUM CHLORIDE (PF) 0.9 % 10 ML INJECTION: Performed by: INTERNAL MEDICINE

## 2021-07-06 RX ADMIN — SODIUM CHLORIDE 2 ML: 9 INJECTION INTRAMUSCULAR; INTRAVENOUS; SUBCUTANEOUS at 15:33

## 2021-07-16 RX ORDER — TOPIRAMATE 50 MG/1
50 TABLET, FILM COATED ORAL NIGHTLY
Qty: 30 TABLET | Refills: 0 | Status: SHIPPED | OUTPATIENT
Start: 2021-07-16 | End: 2021-07-21 | Stop reason: SDUPTHER

## 2021-07-21 ENCOUNTER — OFFICE VISIT (OUTPATIENT)
Dept: BARIATRICS/WEIGHT MGMT | Facility: CLINIC | Age: 61
End: 2021-07-21

## 2021-07-21 VITALS
HEIGHT: 68 IN | RESPIRATION RATE: 18 BRPM | DIASTOLIC BLOOD PRESSURE: 54 MMHG | WEIGHT: 185 LBS | SYSTOLIC BLOOD PRESSURE: 116 MMHG | BODY MASS INDEX: 28.04 KG/M2 | HEART RATE: 75 BPM | TEMPERATURE: 97.3 F

## 2021-07-21 DIAGNOSIS — K21.9 GASTROESOPHAGEAL REFLUX DISEASE WITHOUT ESOPHAGITIS: ICD-10-CM

## 2021-07-21 DIAGNOSIS — I10 ESSENTIAL HYPERTENSION: ICD-10-CM

## 2021-07-21 DIAGNOSIS — E78.5 BORDERLINE HYPERLIPIDEMIA: ICD-10-CM

## 2021-07-21 DIAGNOSIS — M25.50 MULTIPLE JOINT PAIN: ICD-10-CM

## 2021-07-21 DIAGNOSIS — R53.82 CHRONIC FATIGUE: ICD-10-CM

## 2021-07-21 DIAGNOSIS — Z90.3 HISTORY OF SLEEVE GASTRECTOMY: Primary | ICD-10-CM

## 2021-07-21 DIAGNOSIS — F41.8 DEPRESSION WITH ANXIETY: ICD-10-CM

## 2021-07-21 PROCEDURE — 99213 OFFICE O/P EST LOW 20 MIN: CPT | Performed by: NURSE PRACTITIONER

## 2021-07-21 RX ORDER — TOPIRAMATE 50 MG/1
50 TABLET, FILM COATED ORAL NIGHTLY
Qty: 90 TABLET | Refills: 0 | Status: SHIPPED | OUTPATIENT
Start: 2021-07-21 | End: 2021-09-27

## 2021-07-21 RX ORDER — OMEPRAZOLE 20 MG/1
20 CAPSULE, DELAYED RELEASE ORAL DAILY
COMMUNITY

## 2021-07-21 RX ORDER — TRIAMTERENE AND HYDROCHLOROTHIAZIDE 75; 50 MG/1; MG/1
1 TABLET ORAL DAILY
COMMUNITY
Start: 2021-07-03

## 2021-07-21 RX ORDER — LEVOTHYROXINE SODIUM 0.12 MG/1
125 TABLET ORAL DAILY
COMMUNITY
Start: 2021-06-23

## 2021-07-21 NOTE — PROGRESS NOTES
[facility] BARIATRIC SURGERY  [address] [facility] [address]  [facility] [id number] [phone]  [address] [id number] [phone]  Dept: [phone]  7/21/2021      Peggy Winter.  [id number]  [id number]  1960  female      Chief Complaint   Patient presents with   • Follow-up     5 year sleeve       BH Post-Op Bariatric Surgery:   Peggy Winter is status post Laparoscopic Sleeve procedure, performed on 9/26/2016 who has been taking 50mg topirimate for the last two months and reports better control of her appetite, approved sleep. 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     HPI:   Today's weight is 83.9 kg (185 lb) pounds, today's BMI is Body mass index is 28.14 kg/m².,@ has a  loss of 5 pounds since the last visit and@ weight loss since surgery is 836 pounds. The patient reports a decreased portion size and loss of appetite.      Peggy Winter denies nausea, vomiting, relflux and reports that she is doing well     Diet and Exercise: Diet history reviewed and discussed with the patient. Weight loss/gains to date discussed with the patient. The patient states they are eating 80-90 grams of protein per day. She reports eating 3 meals per day, a typical portion size of 1/2 cup, eating 1 snacks per day, drinking 5-6 or more 8-oz. glasses of water per day, no carbonated beverage consumption and exercising regularly- gym 2-3 days per week    Supplements: OTC MTV with irona nd calcium, zinc, fish oil, magnesium, potssium.     Review of Systems   Constitutional: Positive for appetite change. Negative for fatigue and unexpected weight change.   HENT: Negative.         Dry mouth   Eyes: Negative.    Respiratory: Negative.    Cardiovascular: Negative.  Negative for leg swelling.   Gastrointestinal: Negative for abdominal distention, abdominal pain, constipation, diarrhea, nausea and vomiting.    Genitourinary: Negative for difficulty urinating, frequency and urgency.   Musculoskeletal: Negative for back pain.   Skin: Negative.    Psychiatric/Behavioral: Negative.    All other systems reviewed and are negative.      Patient Active Problem List   Diagnosis   • GERD (gastroesophageal reflux disease)   • Essential hypertension   • Chronic fatigue   • Edema   • Multiple joint pain   • Dupuytren's disease   • Depression with anxiety   • Other specified hypothyroidism   • Borderline hyperlipidemia   • Multiple gastric polyps   • Dietary counseling   • Dizziness   • Alopecia   • Pelvic pain   • Increased risk of breast cancer   • Constipation   • Right retinal detachment   • Primary osteoarthritis of left knee   • Stress   • History of sleeve gastrectomy       Past Medical History:   Diagnosis Date   • Anxiety    • Cataract    • Degenerative disc disease, lumbar    • Depression    • Edema     HANDS AND LEGS   • Fatigue    • Fibromyalgia    • Heartburn    • Hemorrhoids    • Hypothyroid    • Joint pain    • Osteoarthritis    • PONV (postoperative nausea and vomiting)    • Restless leg syndrome    • Retinal detachment 2019   • Urge and stress incontinence        The following portions of the patient's history were reviewed and updated as appropriate: allergies, current medications, past family history, past medical history, past social history, past surgical history and problem list.    Vitals:    07/21/21 0848   BP: 116/54   Pulse: 75   Resp: 18   Temp: 97.3 °F (36.3 °C)       Physical Exam  Vitals and nursing note reviewed.   Constitutional:       Appearance: She is well-developed.   Neck:      Thyroid: No thyromegaly.   Cardiovascular:      Rate and Rhythm: Normal rate and regular rhythm.      Heart sounds: Normal heart sounds.   Pulmonary:      Effort: Pulmonary effort is normal. No respiratory distress.      Breath sounds: Normal breath sounds. No wheezing.   Abdominal:      General: Bowel sounds are normal.  There is no distension.      Palpations: Abdomen is soft.      Tenderness: There is no abdominal tenderness. There is no guarding.      Hernia: No hernia is present.   Musculoskeletal:         General: No tenderness.   Skin:     General: Skin is warm and dry.      Findings: No erythema or rash.   Neurological:      Mental Status: She is alert.   Psychiatric:         Behavior: Behavior normal.         Assessment:   Post-op, the patient is doing well.     Encounter Diagnoses   Name Primary?   • History of sleeve gastrectomy Yes   • Borderline hyperlipidemia    • Essential hypertension    • Gastroesophageal reflux disease without esophagitis    • Depression with anxiety    • Multiple joint pain    • Chronic fatigue        Plan:   I did review EKG and echo from cardiac work-up and patient, in my opinion would be an appropriate rest to add phentermine to her course, however, she is down 5 pounds in 2 months on Topamax alone and as we discussed that Lemert can be costly and phentermine at the 37.5mg dose is not approved for long-term use I think we should just continue with topiramate therapy for now. Patient is doing excellent with her protein intake eating well-rounded meals, pushing herself and her exercise and her weight loss is great considering where her BMI is at this point. She does report improved sleep quality as well satiety. I did reiterate to her to continue to increase her fluid intake to avoid kidney stones and she was reminded not to stop this medication abruptly and if she ever has symptoms or would like to come off the importance of weaning this medication.   Encouraged patient to be sure to get plenty of lean protein per day through small frequent meals all with a protein source.   Activity restrictions: none.   Recommended patient be sure to get at least 70 grams of protein per day by eating small, frequent meals all with high lean protein choices. Be sure to limit/cut back on daily carbohydrate  intake. Discussed with the patient the recommended amount of water per day to intake- half of body weight in ounces. Reviewed vitamin requirements. Be sure to do routine exercise, 150 minutes per week minimum, including both cardio and strength training.     Instructions / Recommendations: dietary counseling recommended, recommended a daily protein intake of  grams, vitamin supplement(s) recommended, recommended exercising at least 150 minutes per week, behavior modifications recommended and instructed to call the office for concerns, questions, or problems.     The patient was instructed to follow up in 6 months.      Total time spent during this encounter today was 25 minutes

## 2021-09-27 RX ORDER — TOPIRAMATE 50 MG/1
50 TABLET, FILM COATED ORAL NIGHTLY
Qty: 90 TABLET | Refills: 0 | Status: SHIPPED | OUTPATIENT
Start: 2021-09-27 | End: 2021-12-28 | Stop reason: SDUPTHER

## 2021-10-13 ENCOUNTER — TELEPHONE (OUTPATIENT)
Dept: OBSTETRICS AND GYNECOLOGY | Age: 61
End: 2021-10-13

## 2021-12-28 ENCOUNTER — OFFICE VISIT (OUTPATIENT)
Dept: BARIATRICS/WEIGHT MGMT | Facility: CLINIC | Age: 61
End: 2021-12-28

## 2021-12-28 VITALS
SYSTOLIC BLOOD PRESSURE: 98 MMHG | HEIGHT: 68 IN | RESPIRATION RATE: 18 BRPM | HEART RATE: 65 BPM | BODY MASS INDEX: 26.98 KG/M2 | DIASTOLIC BLOOD PRESSURE: 65 MMHG | WEIGHT: 178 LBS | TEMPERATURE: 97.5 F

## 2021-12-28 DIAGNOSIS — Z90.3 HISTORY OF SLEEVE GASTRECTOMY: Primary | ICD-10-CM

## 2021-12-28 DIAGNOSIS — E66.3 OVERWEIGHT (BMI 25.0-29.9): ICD-10-CM

## 2021-12-28 DIAGNOSIS — F41.8 DEPRESSION WITH ANXIETY: ICD-10-CM

## 2021-12-28 DIAGNOSIS — R53.82 CHRONIC FATIGUE: ICD-10-CM

## 2021-12-28 DIAGNOSIS — K21.9 GASTROESOPHAGEAL REFLUX DISEASE WITHOUT ESOPHAGITIS: ICD-10-CM

## 2021-12-28 DIAGNOSIS — I10 ESSENTIAL HYPERTENSION: ICD-10-CM

## 2021-12-28 PROCEDURE — 99213 OFFICE O/P EST LOW 20 MIN: CPT | Performed by: NURSE PRACTITIONER

## 2021-12-28 RX ORDER — TOPIRAMATE 50 MG/1
50 TABLET, FILM COATED ORAL NIGHTLY
Qty: 90 TABLET | Refills: 1 | Status: SHIPPED | OUTPATIENT
Start: 2021-12-28 | End: 2022-06-06

## 2021-12-28 RX ORDER — HYDROCODONE BITARTRATE AND ACETAMINOPHEN 5; 325 MG/1; MG/1
1 TABLET ORAL EVERY 8 HOURS
COMMUNITY
Start: 2021-12-27

## 2021-12-28 NOTE — PROGRESS NOTES
[facility] BARIATRIC SURGERY  [address] [facility] [address]  [facility] [address]  [phone]  [address] [address]  [phone]  Dept: [phone]  12/28/2021      Peggy Winter.  [id number]  [id number]  1960  female      Chief Complaint   Patient presents with   • Follow-up     sleeve/RX follow up       BH Post-Op Bariatric Surgery:   Peggy Winter is status post Laparoscopic Sleeve procedure, performed on 9/26/21 who has been taking topirimate nightly to help with cravings and satiety for the last 7 months. The visit before starting the medication her weight was 190lbs and she has lost 12lb since starting. She reports better satiety, less sugar cravings. She reports that over the holiday she had a few sweets but any more than one serving was not palatable and she faired well.      HPI:   Today's weight is 80.7 kg (178 lb) pounds, today's BMI is Body mass index is 27.07 kg/m².,@ has a  loss of 7 pounds since the last visit and@ weight loss since surgery is 90 pounds. The patient reports a decreased portion size and loss of appetite.      Peggy Winter denies nausea, vomiting, reflux and reports that she doing well.     Diet and Exercise: Diet history reviewed and discussed with the patient. Weight loss/gains to date discussed with the patient. The patient states they are eating 70-80 grams of protein per day. She reports eating 2 meals per day, a typical portion size of 1 cup, eating 1 snacks per day, drinking 5-6 or more 8-oz. glasses of water per day, no carbonated beverage consumption and exercising regularly- getting to the gym three days per week    Supplements: OTC MTV with iron and calcium.     Review of Systems   Constitutional: Positive for appetite change. Negative for fatigue and unexpected weight change.   HENT: Negative.    Eyes: Negative.    Respiratory: Negative.    Cardiovascular: Negative.  Negative for leg swelling.    Gastrointestinal: Negative for abdominal distention, abdominal pain, constipation, diarrhea, nausea and vomiting.   Genitourinary: Negative for difficulty urinating, frequency and urgency.   Musculoskeletal: Negative for back pain.   Skin: Negative.    Psychiatric/Behavioral: Negative.    All other systems reviewed and are negative.      Patient Active Problem List   Diagnosis   • GERD (gastroesophageal reflux disease)   • Essential hypertension   • Chronic fatigue   • Edema   • Multiple joint pain   • Dupuytren's disease   • Depression with anxiety   • Other specified hypothyroidism   • Borderline hyperlipidemia   • Multiple gastric polyps   • Dietary counseling   • Dizziness   • Overweight (BMI 25.0-29.9)   • Alopecia   • Pelvic pain   • Increased risk of breast cancer   • Constipation   • Right retinal detachment   • Primary osteoarthritis of left knee   • Stress   • History of sleeve gastrectomy       Past Medical History:   Diagnosis Date   • Anxiety    • Cataract    • Degenerative disc disease, lumbar    • Depression    • Edema     HANDS AND LEGS   • Fatigue    • Fibromyalgia    • Heartburn    • Hemorrhoids    • Hypothyroid    • Joint pain    • Osteoarthritis    • PONV (postoperative nausea and vomiting)    • Restless leg syndrome    • Retinal detachment 2019   • Urge and stress incontinence        The following portions of the patient's history were reviewed and updated as appropriate: allergies, current medications, past family history, past medical history, past social history, past surgical history and problem list.    Vitals:    12/28/21 0732   BP: 98/65   Pulse: 65   Resp: 18   Temp: 97.5 °F (36.4 °C)       Physical Exam  Vitals and nursing note reviewed.   Constitutional:       Appearance: She is well-developed.   Neck:      Thyroid: No thyromegaly.   Cardiovascular:      Rate and Rhythm: Normal rate.   Pulmonary:      Effort: Pulmonary effort is normal. No respiratory distress.   Abdominal:       Palpations: Abdomen is soft.   Musculoskeletal:         General: No tenderness.   Skin:     General: Skin is warm and dry.   Neurological:      Mental Status: She is alert.   Psychiatric:         Behavior: Behavior normal.         Assessment:   Post-op, the patient is doing well.       Plan:   Diagnoses and all orders for this visit:    1. History of sleeve gastrectomy (Primary)    2. Essential hypertension    3. Gastroesophageal reflux disease without esophagitis    4. Depression with anxiety    5. Chronic fatigue    6. Overweight (BMI 25.0-29.9)  Overview:  5/25/21 (190lb) start: topirimate 25mg nightly with titration to 50mg   12/28/21 (178 lb or a loss of 15.8% from start)    Orders:  -     topiramate (TOPAMAX) 50 MG tablet; Take 1 tablet by mouth Every Night.  Dispense: 90 tablet; Refill: 1    We will continue topirimate nightly.   Encouraged patient to be sure to get plenty of lean protein per day through small frequent meals all with a protein source.   Activity restrictions: none.   Recommended patient be sure to get at least 70 grams of protein per day by eating small, frequent meals all with high lean protein choices. Be sure to limit/cut back on daily carbohydrate intake. Discussed with the patient the recommended amount of water per day to intake- half of body weight in ounces. Reviewed vitamin requirements. Be sure to do routine exercise, 150 minutes per week minimum, including both cardio and strength training.     Instructions / Recommendations: dietary counseling recommended, recommended a daily protein intake of  grams, vitamin supplement(s) recommended, recommended exercising at least 150 minutes per week, behavior modifications recommended and instructed to call the office for concerns, questions, or problems.     The patient was instructed to follow up in 6 months .     Total time spent during this encounter today was 25 minutes

## 2022-02-28 ENCOUNTER — OFFICE VISIT (OUTPATIENT)
Dept: OBSTETRICS AND GYNECOLOGY | Age: 62
End: 2022-02-28

## 2022-02-28 VITALS
BODY MASS INDEX: 26.49 KG/M2 | SYSTOLIC BLOOD PRESSURE: 122 MMHG | WEIGHT: 174.8 LBS | DIASTOLIC BLOOD PRESSURE: 74 MMHG | HEIGHT: 68 IN

## 2022-02-28 DIAGNOSIS — Z12.11 SCREEN FOR COLON CANCER: ICD-10-CM

## 2022-02-28 DIAGNOSIS — Z01.419 ENCOUNTER FOR GYNECOLOGICAL EXAMINATION WITHOUT ABNORMAL FINDING: Primary | ICD-10-CM

## 2022-02-28 PROCEDURE — 99396 PREV VISIT EST AGE 40-64: CPT | Performed by: OBSTETRICS & GYNECOLOGY

## 2022-02-28 RX ORDER — KETOROLAC TROMETHAMINE 5 MG/ML
SOLUTION OPHTHALMIC
COMMUNITY
Start: 2022-01-25

## 2022-02-28 NOTE — PROGRESS NOTES
Routine Annual Visit    2022    Patient: Peggy Winter          MR#:[id number]      Chief Complaint   Patient presents with   • Gynecologic Exam     Was [facility] pt, last Pap 10/09/17 (-), Last Mammo , last C/Scope , Last Dexa , would like to talk about intercorse issues.       History of Present Illness    61 y.o. female  who presents for annual exam.   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 Patient has had a hysterectomy.  Obstetric History:  OB History        2    Para   2    Term   2            AB        Living   2       SAB        IAB        Ectopic        Molar        Multiple        Live Births   2               Menstrual History:     No LMP recorded (exact date). Patient has had a hysterectomy.       Sexual History:       ________________________________________  Patient Active Problem List   Diagnosis   • GERD (gastroesophageal reflux disease)   • Essential hypertension   • Chronic fatigue   • Edema   • Multiple joint pain   • Dupuytren's disease   • Depression with anxiety   • Other specified hypothyroidism   • Borderline hyperlipidemia   • Multiple gastric polyps   • Dietary counseling   • Dizziness   • Overweight (BMI 25.0-29.9)   • Alopecia   • Pelvic pain   • Increased risk of breast cancer   • Constipation   • Right retinal detachment   • Primary osteoarthritis of left knee   • Stress   • History of sleeve gastrectomy       Past Medical History:   Diagnosis Date   • Anxiety    • Cataract    • Degenerative disc disease, lumbar    • Depression    • Edema     HANDS AND LEGS   • Fatigue    • Fibromyalgia   "  • Heartburn    • Hemorrhoids    • Hypothyroid    • Joint pain    • Osteoarthritis    • PONV (postoperative nausea and vomiting)    • Restless leg syndrome    • Retinal detachment 2019   • Urge and stress incontinence        Past Surgical History:   Procedure Laterality Date   • COLONOSCOPY     • DILATATION AND CURETTAGE     • FOOT SURGERY Left     X 2   • GASTRIC SLEEVE LAPAROSCOPIC N/A 9/26/2016    Procedure: GASTRIC SLEEVE LAPAROSCOPIC;  Surgeon: Yuniel Orantes Jr., MD;  Location: [facility] OR [facility];  Service:    • HYSTERECTOMY     • LAPAROSCOPIC CHOLECYSTECTOMY     • NERVE ROOT EXPLORATION BACK     • RETINAL DETACHMENT REPAIR  2019    x3   • TONSILLECTOMY     • TUBAL ABDOMINAL LIGATION         Social History     Tobacco Use   Smoking Status Never Smoker   Smokeless Tobacco Never Used       has a current medication list which includes the following prescription(s): calcium-phosphorus-vitamin d, clonazepam, cyanocobalamin, fluoxetine, gabapentin, hydrocodone-acetaminophen, ibuprofen, levothyroxine, omeprazole, potassium, prednisolone acetate, topiramate, triamterene-hydrochlorothiazide, cholecalciferol, cyclobenzaprine, fluticasone, and ketorolac.  ________________________________________    Current contraception: status post hysterectomy  History of abnormal Pap smear: no  Family history of Breast cancer: no  Family history of uterine or ovarian cancer: no  Family History of colon cancer/colon polyps: yes - pt with polyps, likely due for CSC  History of abnormal mammogram: no      The following portions of the patient's history were reviewed and updated as appropriate: allergies, current medications, past family history, past medical history, past social history, past surgical history and problem list.    Review of Systems    Pertinent items are noted in HPI.     Objective   Physical Exam    /74   Ht 172.7 cm (67.99\")   Wt 79.3 kg (174 lb 12.8 oz)   LMP  (Exact Date)   Breastfeeding No   BMI 26.59 " "kg/m²    BP Readings from Last 3 Encounters:   02/28/22 122/74   12/28/21 98/65   07/21/21 116/54      Wt Readings from Last 3 Encounters:   02/28/22 79.3 kg (174 lb 12.8 oz)   12/28/21 80.7 kg (178 lb)   07/21/21 83.9 kg (185 lb)      BMI: Estimated body mass index is 26.59 kg/m² as calculated from the following:    Height as of this encounter: 172.7 cm (67.99\").    Weight as of this encounter: 79.3 kg (174 lb 12.8 oz).      General:   alert, appears stated age and cooperative   Abdomen: soft, non-tender, without masses or organomegaly   Breast: inspection negative, no nipple discharge or bleeding, no masses or nodularity palpable   Vulva: normal   Vagina: normal mucosa   Cervix: absent   Uterus: absent    Adnexa: no mass, fullness, tenderness     Assessment:    1. Normal annual exam   Assessment     ICD-10-CM ICD-9-CM   1. Encounter for gynecological examination without abnormal finding  Z01.419 V72.31   2. Screen for colon cancer  Z12.11 V76.51     Plan:    Plan     []  Mammogram request made  []  PAP done  []  Labs:   []  GC/Chl/TV  []  DEXA scan   [x]  Referral for colonoscopy:       Diagnoses and all orders for this visit:    1. Encounter for gynecological examination without abnormal finding (Primary)    2. Screen for colon cancer  -     Ambulatory Referral For Screening Colonoscopy        Counseling:  --Nutrition: Stressed importance of moderation and caloric balance, stressed fresh fruit and vegetables  --Exercise: Stressed the importance of regular exercise. 3-5 times weekly   - Discussed screening mammogram recommendations.   --Discussed benefits of screening colonoscopy- age 45 unless FH  --Discussed pap smear screening recommendations  "

## 2022-03-01 ENCOUNTER — PRE-PROCEDURE SCREENING (OUTPATIENT)
Dept: GASTROENTEROLOGY | Facility: CLINIC | Age: 62
End: 2022-03-01

## 2022-03-28 ENCOUNTER — PREP FOR SURGERY (OUTPATIENT)
Dept: OTHER | Facility: HOSPITAL | Age: 62
End: 2022-03-28

## 2022-03-28 DIAGNOSIS — K63.5 COLON POLYPS: Primary | ICD-10-CM

## 2022-03-28 NOTE — TELEPHONE ENCOUNTER
Last scope 8yrs (no records)--Personal history of polyps --Family history of polyps--No family history of colon cancer--No blood thinners --Medications:      Calcium-Phosphorus-Vitamin D (CALCIUM GUMMIES) 250-100-500 MG-MG-UNIT chewable tablet  Cholecalciferol 1000 units capsule  clonazePAM (KlonoPIN) 1 MG tablet  cyanocobalamin 100 MCG tablet  cyclobenzaprine (FLEXERIL) 10 MG tablet  FLUoxetine (PROzac) 20 MG capsule  fluticasone (FLONASE) 50 MCG/ACT nasal spray  gabapentin (NEURONTIN) 300 MG capsule  HYDROcodone-acetaminophen (NORCO) 5-325 MG per tablet  ibuprofen (ADVIL,MOTRIN) 800 MG tablet  ketorolac (ACULAR) 0.5 % ophthalmic solution  levothyroxine (SYNTHROID, LEVOTHROID) 125 MCG tablet  omeprazole (priLOSEC) 20 MG capsule  POTASSIUM PO  prednisoLONE acetate (PRED FORTE) 1 % ophthalmic suspension  topiramate (TOPAMAX) 50 MG tablet  triamterene-hydrochlorothiazide (MAXZIDE) 75-50 MG per tablet                Pt verbalized and understood that it would be few weeks before been schedule

## 2022-03-29 ENCOUNTER — TELEPHONE (OUTPATIENT)
Dept: GASTROENTEROLOGY | Facility: CLINIC | Age: 62
End: 2022-03-29

## 2022-04-08 ENCOUNTER — TELEPHONE (OUTPATIENT)
Dept: GASTROENTEROLOGY | Facility: CLINIC | Age: 62
End: 2022-04-08

## 2022-04-08 NOTE — TELEPHONE ENCOUNTER
MARY JO April at [facility] via FAX for colonoscopy on 06/22/2022  arrive at  10am  . Mailed Prep instructions to Mailing address on-file. ----miralax      Advised that [facility] will call with final arrival time minimum 24 hrs before procedure. IF they do not get a phone call, arrival time will stay the same as given on instructions

## 2022-06-05 DIAGNOSIS — E66.3 OVERWEIGHT (BMI 25.0-29.9): ICD-10-CM

## 2022-06-06 RX ORDER — TOPIRAMATE 50 MG/1
50 TABLET, FILM COATED ORAL NIGHTLY
Qty: 90 TABLET | Refills: 1 | Status: SHIPPED | OUTPATIENT
Start: 2022-06-06 | End: 2022-12-16

## 2022-06-16 ENCOUNTER — TELEPHONE (OUTPATIENT)
Dept: GASTROENTEROLOGY | Facility: CLINIC | Age: 62
End: 2022-06-16

## 2022-06-17 ENCOUNTER — TELEPHONE (OUTPATIENT)
Dept: GASTROENTEROLOGY | Facility: CLINIC | Age: 62
End: 2022-06-17

## 2022-06-22 NOTE — TELEPHONE ENCOUNTER
MARY JO patient via telephone for Colonoscopy. Scheduled at [facility] 09/07/2022 arrival time 11:30am. Prep paperwork mailed to verified address on file. Patient notified arrival time may change based on [facility] guidelines. Referral emailed to April--Prem

## 2022-09-07 ENCOUNTER — OUTSIDE FACILITY SERVICE (OUTPATIENT)
Dept: GASTROENTEROLOGY | Facility: CLINIC | Age: 62
End: 2022-09-07

## 2022-09-07 PROCEDURE — 45385 COLONOSCOPY W/LESION REMOVAL: CPT | Performed by: INTERNAL MEDICINE

## 2022-09-16 ENCOUNTER — TELEPHONE (OUTPATIENT)
Dept: GASTROENTEROLOGY | Facility: CLINIC | Age: 62
End: 2022-09-16

## 2022-09-16 NOTE — TELEPHONE ENCOUNTER
Called pt and advised of Dr. Phillips's note.  Pt verbalized understanding.     Colonoscopy placed in recall for 9/7/27.

## 2022-09-16 NOTE — TELEPHONE ENCOUNTER
----- Message from Emile Phillips MD sent at 9/15/2022 12:16 PM EDT -----  Tubular adenoma colon polyp  Colonoscopy recall 5 years

## 2022-12-15 DIAGNOSIS — E66.3 OVERWEIGHT (BMI 25.0-29.9): ICD-10-CM

## 2022-12-16 RX ORDER — TOPIRAMATE 50 MG/1
50 TABLET, FILM COATED ORAL NIGHTLY
Qty: 90 TABLET | Refills: 1 | Status: SHIPPED | OUTPATIENT
Start: 2022-12-16 | End: 2023-03-21 | Stop reason: SDUPTHER

## 2023-03-21 ENCOUNTER — OFFICE VISIT (OUTPATIENT)
Dept: BARIATRICS/WEIGHT MGMT | Facility: CLINIC | Age: 63
End: 2023-03-21
Payer: MEDICARE

## 2023-03-21 ENCOUNTER — LAB (OUTPATIENT)
Dept: LAB | Facility: HOSPITAL | Age: 63
End: 2023-03-21
Payer: MEDICARE

## 2023-03-21 VITALS
BODY MASS INDEX: 26.98 KG/M2 | DIASTOLIC BLOOD PRESSURE: 70 MMHG | WEIGHT: 178 LBS | HEART RATE: 83 BPM | TEMPERATURE: 97.3 F | SYSTOLIC BLOOD PRESSURE: 119 MMHG | HEIGHT: 68 IN

## 2023-03-21 DIAGNOSIS — K91.2 POSTOPERATIVE MALABSORPTION: ICD-10-CM

## 2023-03-21 DIAGNOSIS — K21.9 GASTROESOPHAGEAL REFLUX DISEASE WITHOUT ESOPHAGITIS: ICD-10-CM

## 2023-03-21 DIAGNOSIS — Z90.3 HISTORY OF SLEEVE GASTRECTOMY: ICD-10-CM

## 2023-03-21 DIAGNOSIS — I10 ESSENTIAL HYPERTENSION: ICD-10-CM

## 2023-03-21 DIAGNOSIS — E66.3 OVERWEIGHT (BMI 25.0-29.9): ICD-10-CM

## 2023-03-21 DIAGNOSIS — E66.3 OVERWEIGHT (BMI 25.0-29.9): Primary | ICD-10-CM

## 2023-03-21 DIAGNOSIS — L65.9 ALOPECIA: ICD-10-CM

## 2023-03-21 LAB
25(OH)D3 SERPL-MCNC: 112 NG/ML (ref 30–100)
ALBUMIN SERPL-MCNC: 4.5 G/DL (ref 3.5–5.2)
ALBUMIN/GLOB SERPL: 1.8 G/DL
ALP SERPL-CCNC: 104 U/L (ref 39–117)
ALT SERPL W P-5'-P-CCNC: 33 U/L (ref 1–33)
ANION GAP SERPL CALCULATED.3IONS-SCNC: 11.2 MMOL/L (ref 5–15)
AST SERPL-CCNC: 27 U/L (ref 1–32)
BASOPHILS # BLD AUTO: 0.06 10*3/MM3 (ref 0–0.2)
BASOPHILS NFR BLD AUTO: 0.9 % (ref 0–1.5)
BILIRUB SERPL-MCNC: 0.6 MG/DL (ref 0–1.2)
BUN SERPL-MCNC: 25 MG/DL (ref 8–23)
BUN/CREAT SERPL: 29.8 (ref 7–25)
CALCIUM SPEC-SCNC: 9.8 MG/DL (ref 8.6–10.5)
CHLORIDE SERPL-SCNC: 96 MMOL/L (ref 98–107)
CO2 SERPL-SCNC: 31.8 MMOL/L (ref 22–29)
CREAT SERPL-MCNC: 0.84 MG/DL (ref 0.57–1)
DEPRECATED RDW RBC AUTO: 42.5 FL (ref 37–54)
EGFRCR SERPLBLD CKD-EPI 2021: 78.7 ML/MIN/1.73
EOSINOPHIL # BLD AUTO: 0.11 10*3/MM3 (ref 0–0.4)
EOSINOPHIL NFR BLD AUTO: 1.6 % (ref 0.3–6.2)
ERYTHROCYTE [DISTWIDTH] IN BLOOD BY AUTOMATED COUNT: 13 % (ref 12.3–15.4)
FERRITIN SERPL-MCNC: 14.6 NG/ML (ref 13–150)
FOLATE SERPL-MCNC: >20 NG/ML (ref 4.78–24.2)
GLOBULIN UR ELPH-MCNC: 2.5 GM/DL
GLUCOSE SERPL-MCNC: 94 MG/DL (ref 65–99)
HCT VFR BLD AUTO: 42.8 % (ref 34–46.6)
HGB BLD-MCNC: 14 G/DL (ref 12–15.9)
IMM GRANULOCYTES # BLD AUTO: 0.03 10*3/MM3 (ref 0–0.05)
IMM GRANULOCYTES NFR BLD AUTO: 0.4 % (ref 0–0.5)
IRON 24H UR-MRATE: 88 MCG/DL (ref 37–145)
LYMPHOCYTES # BLD AUTO: 2.03 10*3/MM3 (ref 0.7–3.1)
LYMPHOCYTES NFR BLD AUTO: 29.9 % (ref 19.6–45.3)
MCH RBC QN AUTO: 29.7 PG (ref 26.6–33)
MCHC RBC AUTO-ENTMCNC: 32.7 G/DL (ref 31.5–35.7)
MCV RBC AUTO: 90.7 FL (ref 79–97)
MONOCYTES # BLD AUTO: 0.52 10*3/MM3 (ref 0.1–0.9)
MONOCYTES NFR BLD AUTO: 7.6 % (ref 5–12)
NEUTROPHILS NFR BLD AUTO: 4.05 10*3/MM3 (ref 1.7–7)
NEUTROPHILS NFR BLD AUTO: 59.6 % (ref 42.7–76)
NRBC BLD AUTO-RTO: 0 /100 WBC (ref 0–0.2)
PLATELET # BLD AUTO: 354 10*3/MM3 (ref 140–450)
PMV BLD AUTO: 10.5 FL (ref 6–12)
POTASSIUM SERPL-SCNC: 2.9 MMOL/L (ref 3.5–5.2)
PREALB SERPL-MCNC: 27.3 MG/DL (ref 20–40)
PROT SERPL-MCNC: 7 G/DL (ref 6–8.5)
RBC # BLD AUTO: 4.72 10*6/MM3 (ref 3.77–5.28)
SODIUM SERPL-SCNC: 139 MMOL/L (ref 136–145)
WBC NRBC COR # BLD: 6.8 10*3/MM3 (ref 3.4–10.8)

## 2023-03-21 PROCEDURE — 36415 COLL VENOUS BLD VENIPUNCTURE: CPT

## 2023-03-21 PROCEDURE — 3078F DIAST BP <80 MM HG: CPT | Performed by: NURSE PRACTITIONER

## 2023-03-21 PROCEDURE — 85025 COMPLETE CBC W/AUTO DIFF WBC: CPT

## 2023-03-21 PROCEDURE — 99214 OFFICE O/P EST MOD 30 MIN: CPT | Performed by: NURSE PRACTITIONER

## 2023-03-21 PROCEDURE — 82728 ASSAY OF FERRITIN: CPT

## 2023-03-21 PROCEDURE — 83540 ASSAY OF IRON: CPT

## 2023-03-21 PROCEDURE — 84425 ASSAY OF VITAMIN B-1: CPT

## 2023-03-21 PROCEDURE — 82746 ASSAY OF FOLIC ACID SERUM: CPT

## 2023-03-21 PROCEDURE — 80053 COMPREHEN METABOLIC PANEL: CPT

## 2023-03-21 PROCEDURE — 1160F RVW MEDS BY RX/DR IN RCRD: CPT | Performed by: NURSE PRACTITIONER

## 2023-03-21 PROCEDURE — 83921 ORGANIC ACID SINGLE QUANT: CPT

## 2023-03-21 PROCEDURE — 3074F SYST BP LT 130 MM HG: CPT | Performed by: NURSE PRACTITIONER

## 2023-03-21 PROCEDURE — 84134 ASSAY OF PREALBUMIN: CPT

## 2023-03-21 PROCEDURE — 1159F MED LIST DOCD IN RCRD: CPT | Performed by: NURSE PRACTITIONER

## 2023-03-21 PROCEDURE — 82306 VITAMIN D 25 HYDROXY: CPT

## 2023-03-21 RX ORDER — TOPIRAMATE 50 MG/1
75 TABLET, FILM COATED ORAL NIGHTLY
Qty: 90 TABLET | Refills: 1 | Status: SHIPPED | OUTPATIENT
Start: 2023-03-21

## 2023-03-21 NOTE — PROGRESS NOTES
[facility]  [address] [facility] [address]  [facility] [address]  [phone]  [address] [id number] [phone]  Dept: [phone]  3/21/2023      Peggy Winter.  [id number]  [id number]  1960  female      Chief Complaint   Patient presents with   • Follow-up     Fup sleeve       BH Post-Op Bariatric Surgery:   Peggy Winter is status post Laparoscopic Sleeve procedure, performed on 9/26/21 who has been taking topiramate at 50mg in the evenings to help with cravings and increase satiety.    HPI:   Today's weight is 80.7 kg (178 lb) pounds, today's BMI is Body mass index is 27.07 kg/m².,@ has a  loss of 17 pounds since the last visit and@ weight loss since surgery is 115 pounds. The patient reports a decreased portion size and loss of appetite.      Peggy Winter denies nausea, vomiting, reflux and reports that she is doing well. She does report more fatigue and that she is bruising more easily.     She is having a coffee with protein in the mornings, takes a shake for lunch, snack on cheese or ham, has a solid dinner. She has been using a daily fasting regimen and not eating after 8pm. She primarily drinks water, sugar free tea or the occasional diet soda.      Diet and Exercise: Diet history reviewed and discussed with the patient. Weight loss/gains to date discussed with the patient. The patient states they are eating 60-70 grams of protein per day. She reports eating 1 solid meal, 2 protein shakes and a snack per day drinking 6-7 or more 8-oz. glasses of water per day, no carbonated beverage consumption and exercising regularly.     Supplements: OTC MTV with iron and calcium    Review of Systems   Constitutional: Positive for appetite change. Negative for fatigue and unexpected weight change.        Small bruises along forearms   HENT: Negative.    Eyes: Negative.    Respiratory: Negative.    Cardiovascular: Negative.   Negative for leg swelling.   Gastrointestinal: Negative for abdominal distention, abdominal pain, constipation, diarrhea, nausea and vomiting.   Genitourinary: Negative for difficulty urinating, frequency and urgency.   Musculoskeletal: Negative for back pain.   Skin: Negative.    Psychiatric/Behavioral: Negative.    All other systems reviewed and are negative.      Patient Active Problem List   Diagnosis   • GERD (gastroesophageal reflux disease)   • Essential hypertension   • Chronic fatigue   • Edema   • Multiple joint pain   • Dupuytren's disease   • Depression with anxiety   • Other specified hypothyroidism   • Borderline hyperlipidemia   • Multiple gastric polyps   • Dietary counseling   • Dizziness   • Overweight (BMI 25.0-29.9)   • Alopecia   • Pelvic pain   • Increased risk of breast cancer   • Constipation   • Right retinal detachment   • Primary osteoarthritis of left knee   • Stress   • History of sleeve gastrectomy       Past Medical History:   Diagnosis Date   • Anxiety    • Cataract    • Degenerative disc disease, lumbar    • Depression    • Edema     HANDS AND LEGS   • Fatigue    • Fibromyalgia    • Heartburn    • Hemorrhoids    • Hypothyroid    • Joint pain    • Osteoarthritis    • PONV (postoperative nausea and vomiting)    • Restless leg syndrome    • Retinal detachment 2019   • Urge and stress incontinence        The following portions of the patient's history were reviewed and updated as appropriate: allergies, current medications, past family history, past medical history, past social history, past surgical history and problem list.    Vitals:    03/21/23 1127   BP: 119/70   Pulse: 83   Temp: 97.3 °F (36.3 °C)       Physical Exam  Vitals and nursing note reviewed.   Constitutional:       Appearance: She is well-developed.   Neck:      Thyroid: No thyromegaly.   Cardiovascular:      Rate and Rhythm: Normal rate.   Pulmonary:      Effort: Pulmonary effort is normal. No respiratory distress.    Abdominal:      Palpations: Abdomen is soft.   Musculoskeletal:         General: No tenderness.   Skin:     General: Skin is warm and dry.   Neurological:      Mental Status: She is alert.   Psychiatric:         Behavior: Behavior normal.         Assessment:   Post-op, the patient is doing well.     Encounter Diagnoses   Name Primary?   • Overweight (BMI 25.0-29.9) Yes   • History of sleeve gastrectomy    • Essential hypertension    • Gastroesophageal reflux disease without esophagitis    • Alopecia        Plan:   Will trend vitamin level, prealbumin, CBC and CMP. Continue supplementing on protein intake. Increase topiramate to 75mg per day.  Encouraged patient to be sure to get plenty of lean protein per day through small frequent meals all with a protein source.   Activity restrictions: none.   Recommended patient be sure to get at least 70 grams of protein per day by eating small, frequent meals all with high lean protein choices. Be sure to limit/cut back on daily carbohydrate intake. Discussed with the patient the recommended amount of water per day to intake- half of body weight in ounces. Reviewed vitamin requirements. Be sure to do routine exercise, 150 minutes per week minimum, including both cardio and strength training.     Instructions / Recommendations: dietary counseling recommended, recommended a daily protein intake of  grams, vitamin supplement(s) recommended, recommended exercising at least 150 minutes per week, behavior modifications recommended and instructed to call the office for concerns, questions, or problems.     The patient was instructed to follow up in 6-12 months .     Total time spent during this encounter today was 35 minutes

## 2023-03-23 LAB — VIT B1 BLD-SCNC: 133.6 NMOL/L (ref 66.5–200)

## 2023-03-25 LAB — METHYLMALONATE SERPL-SCNC: 183 NMOL/L (ref 0–378)

## 2023-03-27 RX ORDER — POTASSIUM CHLORIDE 20 MEQ/1
TABLET, EXTENDED RELEASE ORAL
Qty: 21 TABLET | Refills: 0 | Status: SHIPPED | OUTPATIENT
Start: 2023-03-27 | End: 2023-04-10

## 2023-05-09 ENCOUNTER — TELEPHONE (OUTPATIENT)
Dept: BARIATRICS/WEIGHT MGMT | Facility: CLINIC | Age: 63
End: 2023-05-09
Payer: MEDICARE

## 2023-05-09 NOTE — TELEPHONE ENCOUNTER
Patient is currently on Topamax for weight loss, but wants to switch to Wegovy. She has Medicare and Passport, I explained her that her insurance does not cover for any injectable weight loss medication. I also explained her that if she wants any other changes in her regimen she would need to come to the office and discuss it with one of our providers, but I emphasized on the fact that her insurance does not cover for any injectable weight loss medication.

## 2024-09-27 NOTE — TELEPHONE ENCOUNTER
PT NEEDS TO RESCHEDULE PROCEDURE. [phone]   [facility] is calling about the medication warfarin 4mg and  how this is to be taken   Please call [phone]